# Patient Record
Sex: FEMALE | Race: WHITE | Employment: FULL TIME | URBAN - METROPOLITAN AREA
[De-identification: names, ages, dates, MRNs, and addresses within clinical notes are randomized per-mention and may not be internally consistent; named-entity substitution may affect disease eponyms.]

---

## 2017-04-20 ENCOUNTER — HOSPITAL ENCOUNTER (OUTPATIENT)
Age: 52
Setting detail: SPECIMEN
Discharge: HOME | End: 2017-04-20
Payer: COMMERCIAL

## 2017-04-20 ENCOUNTER — OFFICE VISIT (OUTPATIENT)
Dept: INTERNAL MEDICINE CLINIC | Age: 52
End: 2017-04-20
Payer: COMMERCIAL

## 2017-04-20 VITALS
SYSTOLIC BLOOD PRESSURE: 120 MMHG | HEIGHT: 63 IN | TEMPERATURE: 98.3 F | BODY MASS INDEX: 26.86 KG/M2 | OXYGEN SATURATION: 98 % | DIASTOLIC BLOOD PRESSURE: 70 MMHG | WEIGHT: 151.6 LBS | RESPIRATION RATE: 17 BRPM | HEART RATE: 57 BPM

## 2017-04-20 DIAGNOSIS — M54.2 DISCOGENIC CERVICAL PAIN: ICD-10-CM

## 2017-04-20 DIAGNOSIS — R53.83 OTHER FATIGUE: ICD-10-CM

## 2017-04-20 DIAGNOSIS — E78.49 OTHER HYPERLIPIDEMIA: Primary | ICD-10-CM

## 2017-04-20 DIAGNOSIS — F41.9 ANXIETY: ICD-10-CM

## 2017-04-20 DIAGNOSIS — Z91.199 NON-COMPLIANT PATIENT: ICD-10-CM

## 2017-04-20 DIAGNOSIS — Z12.11 SPECIAL SCREENING FOR MALIGNANT NEOPLASMS, COLON: ICD-10-CM

## 2017-04-20 DIAGNOSIS — E78.49 OTHER HYPERLIPIDEMIA: ICD-10-CM

## 2017-04-20 LAB
ABSOLUTE EOS #: 0.2 K/UL (ref 0–0.4)
ABSOLUTE LYMPH #: 2.1 K/UL (ref 1–4.8)
ABSOLUTE MONO #: 0.4 K/UL (ref 0.1–1.2)
ALBUMIN SERPL-MCNC: 4.1 G/DL (ref 3.5–5.2)
ALBUMIN/GLOBULIN RATIO: 1.3 (ref 1–2.5)
ALP BLD-CCNC: 84 U/L (ref 35–104)
ALT SERPL-CCNC: 12 U/L (ref 5–33)
ANION GAP SERPL CALCULATED.3IONS-SCNC: 12 MMOL/L (ref 9–17)
AST SERPL-CCNC: 16 U/L
BASOPHILS # BLD: 1 % (ref 0–2)
BASOPHILS ABSOLUTE: 0 K/UL (ref 0–0.2)
BILIRUB SERPL-MCNC: 1.12 MG/DL (ref 0.3–1.2)
BUN BLDV-MCNC: 10 MG/DL (ref 6–20)
BUN/CREAT BLD: ABNORMAL (ref 9–20)
CALCIUM SERPL-MCNC: 9.1 MG/DL (ref 8.6–10.4)
CHLORIDE BLD-SCNC: 99 MMOL/L (ref 98–107)
CHOLESTEROL/HDL RATIO: 2.7
CHOLESTEROL: 195 MG/DL
CO2: 28 MMOL/L (ref 20–31)
CREAT SERPL-MCNC: 0.62 MG/DL (ref 0.5–0.9)
DIFFERENTIAL TYPE: NORMAL
EOSINOPHILS RELATIVE PERCENT: 3 % (ref 1–4)
ESTIMATED AVERAGE GLUCOSE: 108 MG/DL
GFR AFRICAN AMERICAN: >60 ML/MIN
GFR NON-AFRICAN AMERICAN: >60 ML/MIN
GFR SERPL CREATININE-BSD FRML MDRD: ABNORMAL ML/MIN/{1.73_M2}
GFR SERPL CREATININE-BSD FRML MDRD: ABNORMAL ML/MIN/{1.73_M2}
GLUCOSE BLD-MCNC: 86 MG/DL (ref 70–99)
HAV IGM SER IA-ACNC: NONREACTIVE
HBA1C MFR BLD: 5.4 % (ref 4–6)
HCT VFR BLD CALC: 40.8 % (ref 36–46)
HDLC SERPL-MCNC: 72 MG/DL
HEMOGLOBIN: 13.8 G/DL (ref 12–16)
HEPATITIS B CORE IGM ANTIBODY: NONREACTIVE
HEPATITIS B SURFACE ANTIGEN: NONREACTIVE
HEPATITIS C ANTIBODY: NONREACTIVE
HIV AG/AB: NONREACTIVE
LDL CHOLESTEROL: 104 MG/DL (ref 0–130)
LYMPHOCYTES # BLD: 36 % (ref 24–44)
MCH RBC QN AUTO: 28.5 PG (ref 26–34)
MCHC RBC AUTO-ENTMCNC: 33.8 G/DL (ref 31–37)
MCV RBC AUTO: 84.3 FL (ref 80–100)
MONOCYTES # BLD: 7 % (ref 2–11)
PDW BLD-RTO: 14.3 % (ref 12.5–15.4)
PLATELET # BLD: 237 K/UL (ref 140–450)
PLATELET ESTIMATE: NORMAL
PMV BLD AUTO: 8.8 FL (ref 6–12)
POTASSIUM SERPL-SCNC: 3.6 MMOL/L (ref 3.7–5.3)
RBC # BLD: 4.84 M/UL (ref 4–5.2)
RBC # BLD: NORMAL 10*6/UL
SEG NEUTROPHILS: 53 % (ref 36–66)
SEGMENTED NEUTROPHILS ABSOLUTE COUNT: 3 K/UL (ref 1.8–7.7)
SODIUM BLD-SCNC: 139 MMOL/L (ref 135–144)
TOTAL PROTEIN: 7.3 G/DL (ref 6.4–8.3)
TRIGL SERPL-MCNC: 94 MG/DL
TSH SERPL DL<=0.05 MIU/L-ACNC: 2.09 MIU/L (ref 0.3–5)
VLDLC SERPL CALC-MCNC: NORMAL MG/DL (ref 1–30)
WBC # BLD: 5.7 K/UL (ref 3.5–11)
WBC # BLD: NORMAL 10*3/UL

## 2017-04-20 PROCEDURE — 99214 OFFICE O/P EST MOD 30 MIN: CPT | Performed by: FAMILY MEDICINE

## 2017-04-20 ASSESSMENT — PATIENT HEALTH QUESTIONNAIRE - PHQ9
2. FEELING DOWN, DEPRESSED OR HOPELESS: 0
1. LITTLE INTEREST OR PLEASURE IN DOING THINGS: 0
SUM OF ALL RESPONSES TO PHQ QUESTIONS 1-9: 0
SUM OF ALL RESPONSES TO PHQ9 QUESTIONS 1 & 2: 0

## 2017-04-20 ASSESSMENT — ENCOUNTER SYMPTOMS
RESPIRATORY NEGATIVE: 1
ALLERGIC/IMMUNOLOGIC NEGATIVE: 1
EYES NEGATIVE: 1
GASTROINTESTINAL NEGATIVE: 1

## 2017-05-01 ENCOUNTER — TELEPHONE (OUTPATIENT)
Dept: INTERNAL MEDICINE CLINIC | Age: 52
End: 2017-05-01

## 2017-05-01 DIAGNOSIS — Z12.11 SPECIAL SCREENING FOR MALIGNANT NEOPLASMS, COLON: ICD-10-CM

## 2017-05-01 DIAGNOSIS — F41.9 ANXIETY: ICD-10-CM

## 2017-05-01 DIAGNOSIS — R53.83 OTHER FATIGUE: ICD-10-CM

## 2017-05-01 DIAGNOSIS — E78.49 OTHER HYPERLIPIDEMIA: ICD-10-CM

## 2017-05-01 LAB
CONTROL: NORMAL
HEMOCCULT STL QL: NEGATIVE

## 2017-05-01 PROCEDURE — 82274 ASSAY TEST FOR BLOOD FECAL: CPT | Performed by: FAMILY MEDICINE

## 2017-08-23 ENCOUNTER — OFFICE VISIT (OUTPATIENT)
Dept: INTERNAL MEDICINE CLINIC | Age: 52
End: 2017-08-23
Payer: COMMERCIAL

## 2017-08-23 ENCOUNTER — HOSPITAL ENCOUNTER (OUTPATIENT)
Dept: GENERAL RADIOLOGY | Age: 52
Discharge: HOME | End: 2017-08-23
Payer: COMMERCIAL

## 2017-08-23 ENCOUNTER — HOSPITAL ENCOUNTER (OUTPATIENT)
Age: 52
Discharge: HOME | End: 2017-08-23
Payer: COMMERCIAL

## 2017-08-23 VITALS
RESPIRATION RATE: 15 BRPM | OXYGEN SATURATION: 96 % | WEIGHT: 152.2 LBS | DIASTOLIC BLOOD PRESSURE: 80 MMHG | SYSTOLIC BLOOD PRESSURE: 110 MMHG | HEART RATE: 59 BPM | BODY MASS INDEX: 26.97 KG/M2 | TEMPERATURE: 97.7 F | HEIGHT: 63 IN

## 2017-08-23 DIAGNOSIS — M54.2 NECK PAIN: ICD-10-CM

## 2017-08-23 DIAGNOSIS — E78.2 MIXED HYPERLIPIDEMIA: ICD-10-CM

## 2017-08-23 DIAGNOSIS — M25.561 CHRONIC PAIN OF RIGHT KNEE: ICD-10-CM

## 2017-08-23 DIAGNOSIS — G89.29 CHRONIC PAIN OF RIGHT KNEE: ICD-10-CM

## 2017-08-23 DIAGNOSIS — M22.41 CHONDROMALACIA OF PATELLOFEMORAL JOINT, RIGHT: Primary | ICD-10-CM

## 2017-08-23 DIAGNOSIS — F41.9 ANXIETY: ICD-10-CM

## 2017-08-23 PROCEDURE — 90471 IMMUNIZATION ADMIN: CPT | Performed by: FAMILY MEDICINE

## 2017-08-23 PROCEDURE — 99214 OFFICE O/P EST MOD 30 MIN: CPT | Performed by: FAMILY MEDICINE

## 2017-08-23 PROCEDURE — 73562 X-RAY EXAM OF KNEE 3: CPT

## 2017-08-23 PROCEDURE — 90715 TDAP VACCINE 7 YRS/> IM: CPT | Performed by: FAMILY MEDICINE

## 2017-08-23 RX ORDER — FLUOXETINE 10 MG/1
10 TABLET, FILM COATED ORAL DAILY
COMMUNITY
End: 2019-02-14 | Stop reason: ALTCHOICE

## 2017-08-23 ASSESSMENT — ENCOUNTER SYMPTOMS
RESPIRATORY NEGATIVE: 1
ALLERGIC/IMMUNOLOGIC NEGATIVE: 1
GASTROINTESTINAL NEGATIVE: 1
EYES NEGATIVE: 1

## 2017-11-02 ENCOUNTER — HOSPITAL ENCOUNTER (OUTPATIENT)
Age: 52
Setting detail: SPECIMEN
Discharge: HOME | End: 2017-11-02
Payer: COMMERCIAL

## 2017-11-02 ENCOUNTER — OFFICE VISIT (OUTPATIENT)
Dept: ORTHOPEDIC SURGERY | Age: 52
End: 2017-11-02
Payer: COMMERCIAL

## 2017-11-02 VITALS — WEIGHT: 152.12 LBS | HEIGHT: 63 IN | BODY MASS INDEX: 26.95 KG/M2

## 2017-11-02 DIAGNOSIS — M17.11 PRIMARY OSTEOARTHRITIS OF RIGHT KNEE: Primary | ICD-10-CM

## 2017-11-02 DIAGNOSIS — M25.561 CHRONIC PAIN OF RIGHT KNEE: ICD-10-CM

## 2017-11-02 DIAGNOSIS — G89.29 CHRONIC PAIN OF RIGHT KNEE: ICD-10-CM

## 2017-11-02 LAB
C-REACTIVE PROTEIN: 3.4 MG/L (ref 0–5)
RHEUMATOID FACTOR: <10 IU/ML
SEDIMENTATION RATE, ERYTHROCYTE: 5 MM (ref 0–20)

## 2017-11-02 PROCEDURE — 99203 OFFICE O/P NEW LOW 30 MIN: CPT | Performed by: ORTHOPAEDIC SURGERY

## 2017-11-16 ENCOUNTER — OFFICE VISIT (OUTPATIENT)
Dept: ORTHOPEDIC SURGERY | Age: 52
End: 2017-11-16
Payer: COMMERCIAL

## 2017-11-16 VITALS — WEIGHT: 152.12 LBS | HEIGHT: 63 IN | BODY MASS INDEX: 26.95 KG/M2

## 2017-11-16 DIAGNOSIS — M25.561 CHRONIC PAIN OF RIGHT KNEE: Primary | ICD-10-CM

## 2017-11-16 DIAGNOSIS — G89.29 CHRONIC PAIN OF RIGHT KNEE: Primary | ICD-10-CM

## 2017-11-16 DIAGNOSIS — M17.11 PATELLOFEMORAL ARTHRITIS OF RIGHT KNEE: ICD-10-CM

## 2017-11-16 PROCEDURE — 20610 DRAIN/INJ JOINT/BURSA W/O US: CPT | Performed by: ORTHOPAEDIC SURGERY

## 2017-11-16 PROCEDURE — 99213 OFFICE O/P EST LOW 20 MIN: CPT | Performed by: ORTHOPAEDIC SURGERY

## 2017-11-16 RX ORDER — METHYLPREDNISOLONE ACETATE 40 MG/ML
40 INJECTION, SUSPENSION INTRA-ARTICULAR; INTRALESIONAL; INTRAMUSCULAR; SOFT TISSUE ONCE
Status: COMPLETED | OUTPATIENT
Start: 2017-11-16 | End: 2017-11-16

## 2017-11-16 RX ADMIN — METHYLPREDNISOLONE ACETATE 40 MG: 40 INJECTION, SUSPENSION INTRA-ARTICULAR; INTRALESIONAL; INTRAMUSCULAR; SOFT TISSUE at 10:28

## 2017-11-16 NOTE — PROGRESS NOTES
This patient still continues to have pain in the knee. She again describes this as over the superolateral aspect of the patella. She has complaints of minimal pain on the medial side of the joint. She continues to her problem getting in and out of the car as well. Examination: There was some minimal tenderness over the medial joint line. There is significant patellofemoral grating. There is definite tenderness over the superolateral aspect of the patella with possible palpable spur. I again reviewed her x-rays which did not include sunrise view of the patella. Diagnosis: Primary osteoarthritis of the right knee but mainly affecting the patellofemoral compartment clinically. Treatment: After thoroughly cleaning the skin with Betadine and alcohol I injected her right knee with 40 mg Depo-Medrol and 5 mL of 1% plain lidocaine. She did feel significant improvement in her symptoms when ambulating here in the hallway. I will see her in 2 weeks' time but should her symptoms have subsided that she will call up and cancel the appointment.

## 2017-12-28 ENCOUNTER — OFFICE VISIT (OUTPATIENT)
Dept: INTERNAL MEDICINE CLINIC | Age: 52
End: 2017-12-28
Payer: COMMERCIAL

## 2017-12-28 VITALS
RESPIRATION RATE: 17 BRPM | BODY MASS INDEX: 28 KG/M2 | HEIGHT: 63 IN | HEART RATE: 71 BPM | SYSTOLIC BLOOD PRESSURE: 110 MMHG | WEIGHT: 158 LBS | OXYGEN SATURATION: 98 % | DIASTOLIC BLOOD PRESSURE: 70 MMHG

## 2017-12-28 DIAGNOSIS — Z28.21 INFLUENZA VACCINATION DECLINED: ICD-10-CM

## 2017-12-28 DIAGNOSIS — Z86.59 H/O ANXIETY DISORDER: ICD-10-CM

## 2017-12-28 DIAGNOSIS — M23.91 INTERNAL DERANGEMENT OF RIGHT KNEE: Primary | ICD-10-CM

## 2017-12-28 DIAGNOSIS — E78.49 OTHER HYPERLIPIDEMIA: ICD-10-CM

## 2017-12-28 DIAGNOSIS — Z90.710 H/O: HYSTERECTOMY: ICD-10-CM

## 2017-12-28 PROCEDURE — 99213 OFFICE O/P EST LOW 20 MIN: CPT | Performed by: FAMILY MEDICINE

## 2017-12-28 ASSESSMENT — ENCOUNTER SYMPTOMS
ALLERGIC/IMMUNOLOGIC NEGATIVE: 1
EYES NEGATIVE: 1
GASTROINTESTINAL NEGATIVE: 1
RESPIRATORY NEGATIVE: 1

## 2017-12-28 NOTE — PROGRESS NOTES
Chronic Disease Visit Information    BP Readings from Last 3 Encounters:   12/28/17 110/70   08/23/17 110/80   04/20/17 120/70          Hemoglobin A1C (%)   Date Value   04/20/2017 5.4     LDL Cholesterol (mg/dL)   Date Value   04/20/2017 104     HDL (mg/dL)   Date Value   04/20/2017 72     BUN (mg/dL)   Date Value   04/20/2017 10     CREATININE (mg/dL)   Date Value   04/20/2017 0.62     Glucose (mg/dL)   Date Value   04/20/2017 86            Have you changed or started any medications since your last visit including any over-the-counter medicines, vitamins, or herbal medicines? no   Are you having any side effects from any of your medications? -  no  Have you stopped taking any of your medications? Is so, why? -  no    Have you seen any other physician or provider since your last visit? No  Have you had any other diagnostic tests since your last visit? No  Have you been seen in the emergency room and/or had an admission to a hospital since we last saw you? No  Have you had your annual diabetic retinal (eye) exam? Yes - Records Requested  Have you had your routine dental cleaning in the past 6 months? yes -     Have you activated your [employer] account? If not, what are your barriers?  Yes     Patient Care Team:  Christin Russell MD as PCP - General (Family Medicine)         Medical History Review  Past Medical, Family, and Social History reviewed and does contribute to the patient presenting condition    Health Maintenance   Topic Date Due    Flu vaccine (1) 09/01/2017    Cervical cancer screen  10/30/2017    Colon Cancer Screen FIT/FOBT  05/01/2018    Breast cancer screen  01/10/2019    Lipid screen  04/20/2022    DTaP/Tdap/Td vaccine (2 - Td) 08/23/2027    Hepatitis C screen  Completed    HIV screen  Completed

## 2017-12-28 NOTE — PROGRESS NOTES
Subjective:      Patient ID: Melissa Mercedes is a 45 y.o. female. Hyperlipidemia   This is a chronic problem. The current episode started more than 1 month ago. The problem is controlled. There are no known factors aggravating her hyperlipidemia. Current antihyperlipidemic treatment includes exercise and diet change. The current treatment provides moderate improvement of lipids. Risk factors for coronary artery disease include stress, post-menopausal and dyslipidemia. Review of Systems   Constitutional: Negative. HENT: Negative. Eyes: Negative. Respiratory: Negative. Cardiovascular: Negative. Gastrointestinal: Negative. Endocrine: Negative. Musculoskeletal: Negative. Skin: Negative. Allergic/Immunologic: Negative. Neurological: Negative. Hematological: Negative. Psychiatric/Behavioral: Negative. Past family and social history unremarkable. Objective:   Physical Exam   Constitutional: She is oriented to person, place, and time. She appears well-developed and well-nourished. HENT:   Head: Normocephalic and atraumatic. Right Ear: External ear normal.   Left Ear: External ear normal.   Mouth/Throat: Oropharynx is clear and moist.   Eyes: Conjunctivae and EOM are normal. Pupils are equal, round, and reactive to light. Neck: Normal range of motion. Neck supple. Cardiovascular: Normal rate, regular rhythm, normal heart sounds and intact distal pulses. Pulmonary/Chest: Effort normal and breath sounds normal.   Abdominal: Soft. Bowel sounds are normal.   Genitourinary: Vagina normal and uterus normal.   Musculoskeletal: Normal range of motion. Neurological: She is alert and oriented to person, place, and time. She has normal reflexes. Skin: Skin is warm and dry. Psychiatric: She has a normal mood and affect. Her behavior is normal. Judgment and thought content normal.   Vitals reviewed. Assessment:      1. Internal derangement of right knee     2.  Other

## 2018-06-27 ENCOUNTER — HOSPITAL ENCOUNTER (OUTPATIENT)
Age: 53
Setting detail: SPECIMEN
Discharge: HOME | End: 2018-06-27
Payer: COMMERCIAL

## 2018-06-27 ENCOUNTER — OFFICE VISIT (OUTPATIENT)
Dept: INTERNAL MEDICINE CLINIC | Age: 53
End: 2018-06-27
Payer: COMMERCIAL

## 2018-06-27 VITALS
DIASTOLIC BLOOD PRESSURE: 80 MMHG | HEART RATE: 54 BPM | TEMPERATURE: 98 F | WEIGHT: 156 LBS | SYSTOLIC BLOOD PRESSURE: 110 MMHG | HEIGHT: 63 IN | BODY MASS INDEX: 27.64 KG/M2 | OXYGEN SATURATION: 99 %

## 2018-06-27 DIAGNOSIS — Z12.11 SCREENING FOR COLON CANCER: ICD-10-CM

## 2018-06-27 DIAGNOSIS — E78.49 OTHER HYPERLIPIDEMIA: ICD-10-CM

## 2018-06-27 DIAGNOSIS — M79.18 MUSCULOSKELETAL PAIN: ICD-10-CM

## 2018-06-27 DIAGNOSIS — F41.9 ANXIETY: ICD-10-CM

## 2018-06-27 DIAGNOSIS — Z23 NEED FOR PROPHYLACTIC VACCINATION AND INOCULATION AGAINST VARICELLA: ICD-10-CM

## 2018-06-27 DIAGNOSIS — E78.49 OTHER HYPERLIPIDEMIA: Primary | ICD-10-CM

## 2018-06-27 LAB
ALBUMIN SERPL-MCNC: 4.2 G/DL (ref 3.5–5.2)
ALBUMIN/GLOBULIN RATIO: 1.4 (ref 1–2.5)
ALP BLD-CCNC: 86 U/L (ref 35–104)
ALT SERPL-CCNC: 16 U/L (ref 5–33)
ANION GAP SERPL CALCULATED.3IONS-SCNC: 12 MMOL/L (ref 9–17)
AST SERPL-CCNC: 20 U/L
BILIRUB SERPL-MCNC: 0.99 MG/DL (ref 0.3–1.2)
BUN BLDV-MCNC: 13 MG/DL (ref 6–20)
BUN/CREAT BLD: ABNORMAL (ref 9–20)
CALCIUM SERPL-MCNC: 8.8 MG/DL (ref 8.6–10.4)
CHLORIDE BLD-SCNC: 108 MMOL/L (ref 98–107)
CHOLESTEROL/HDL RATIO: 2.7
CHOLESTEROL: 182 MG/DL
CO2: 26 MMOL/L (ref 20–31)
CREAT SERPL-MCNC: 0.56 MG/DL (ref 0.5–0.9)
GFR AFRICAN AMERICAN: >60 ML/MIN
GFR NON-AFRICAN AMERICAN: >60 ML/MIN
GFR SERPL CREATININE-BSD FRML MDRD: ABNORMAL ML/MIN/{1.73_M2}
GFR SERPL CREATININE-BSD FRML MDRD: ABNORMAL ML/MIN/{1.73_M2}
GLUCOSE BLD-MCNC: 84 MG/DL (ref 70–99)
HCT VFR BLD CALC: 42.2 % (ref 36.3–47.1)
HDLC SERPL-MCNC: 68 MG/DL
HEMOGLOBIN: 13.4 G/DL (ref 11.9–15.1)
LDL CHOLESTEROL: 101 MG/DL (ref 0–130)
MCH RBC QN AUTO: 28.1 PG (ref 25.2–33.5)
MCHC RBC AUTO-ENTMCNC: 31.8 G/DL (ref 28.4–34.8)
MCV RBC AUTO: 88.5 FL (ref 82.6–102.9)
NRBC AUTOMATED: 0 PER 100 WBC
PDW BLD-RTO: 13 % (ref 11.8–14.4)
PLATELET # BLD: 259 K/UL (ref 138–453)
PMV BLD AUTO: 10.5 FL (ref 8.1–13.5)
POTASSIUM SERPL-SCNC: 4.1 MMOL/L (ref 3.7–5.3)
RBC # BLD: 4.77 M/UL (ref 3.95–5.11)
SODIUM BLD-SCNC: 146 MMOL/L (ref 135–144)
TOTAL PROTEIN: 7.2 G/DL (ref 6.4–8.3)
TRIGL SERPL-MCNC: 64 MG/DL
TSH SERPL DL<=0.05 MIU/L-ACNC: 2.16 MIU/L (ref 0.3–5)
VLDLC SERPL CALC-MCNC: NORMAL MG/DL (ref 1–30)
WBC # BLD: 5.1 K/UL (ref 3.5–11.3)

## 2018-06-27 PROCEDURE — 99213 OFFICE O/P EST LOW 20 MIN: CPT | Performed by: FAMILY MEDICINE

## 2018-06-27 ASSESSMENT — PATIENT HEALTH QUESTIONNAIRE - PHQ9
SUM OF ALL RESPONSES TO PHQ QUESTIONS 1-9: 0
SUM OF ALL RESPONSES TO PHQ9 QUESTIONS 1 & 2: 0
2. FEELING DOWN, DEPRESSED OR HOPELESS: 0
1. LITTLE INTEREST OR PLEASURE IN DOING THINGS: 0

## 2018-06-27 ASSESSMENT — ENCOUNTER SYMPTOMS
RESPIRATORY NEGATIVE: 1
GASTROINTESTINAL NEGATIVE: 1
EYES NEGATIVE: 1
ALLERGIC/IMMUNOLOGIC NEGATIVE: 1

## 2018-07-11 DIAGNOSIS — Z12.11 SCREENING FOR COLON CANCER: ICD-10-CM

## 2018-07-11 LAB
CONTROL: NORMAL
HEMOCCULT STL QL: NORMAL

## 2018-07-11 PROCEDURE — 82274 ASSAY TEST FOR BLOOD FECAL: CPT | Performed by: FAMILY MEDICINE

## 2018-12-20 ENCOUNTER — OFFICE VISIT (OUTPATIENT)
Dept: INTERNAL MEDICINE CLINIC | Age: 53
End: 2018-12-20
Payer: COMMERCIAL

## 2018-12-20 VITALS
OXYGEN SATURATION: 98 % | SYSTOLIC BLOOD PRESSURE: 120 MMHG | HEART RATE: 59 BPM | BODY MASS INDEX: 27.57 KG/M2 | RESPIRATION RATE: 15 BRPM | WEIGHT: 155.6 LBS | DIASTOLIC BLOOD PRESSURE: 80 MMHG | HEIGHT: 63 IN

## 2018-12-20 DIAGNOSIS — F41.9 ANXIETY: ICD-10-CM

## 2018-12-20 DIAGNOSIS — M23.91 INTERNAL DERANGEMENT OF MULTIPLE SITES OF RIGHT KNEE: ICD-10-CM

## 2018-12-20 DIAGNOSIS — Z12.39 BREAST SCREENING: ICD-10-CM

## 2018-12-20 DIAGNOSIS — Z28.21 INFLUENZA VACCINATION DECLINED: ICD-10-CM

## 2018-12-20 DIAGNOSIS — E78.2 MIXED HYPERLIPIDEMIA: Primary | ICD-10-CM

## 2018-12-20 DIAGNOSIS — Z90.710 H/O: HYSTERECTOMY: ICD-10-CM

## 2018-12-20 PROCEDURE — 99213 OFFICE O/P EST LOW 20 MIN: CPT | Performed by: FAMILY MEDICINE

## 2018-12-20 ASSESSMENT — ENCOUNTER SYMPTOMS
ALLERGIC/IMMUNOLOGIC NEGATIVE: 1
RESPIRATORY NEGATIVE: 1
GASTROINTESTINAL NEGATIVE: 1
EYES NEGATIVE: 1

## 2019-01-09 ENCOUNTER — HOSPITAL ENCOUNTER (OUTPATIENT)
Dept: MAMMOGRAPHY | Age: 54
Discharge: HOME | End: 2019-01-11
Payer: COMMERCIAL

## 2019-01-09 DIAGNOSIS — Z12.39 BREAST SCREENING: ICD-10-CM

## 2019-01-09 PROCEDURE — 77067 SCR MAMMO BI INCL CAD: CPT

## 2019-02-14 ENCOUNTER — OFFICE VISIT (OUTPATIENT)
Dept: ORTHOPEDIC SURGERY | Age: 54
End: 2019-02-14
Payer: COMMERCIAL

## 2019-02-14 VITALS — WEIGHT: 158 LBS | BODY MASS INDEX: 28 KG/M2 | HEIGHT: 63 IN

## 2019-02-14 DIAGNOSIS — G89.29 CHRONIC PAIN OF RIGHT KNEE: Primary | ICD-10-CM

## 2019-02-14 DIAGNOSIS — M25.561 CHRONIC PAIN OF RIGHT KNEE: Primary | ICD-10-CM

## 2019-02-14 DIAGNOSIS — M17.11 PATELLOFEMORAL ARTHRITIS OF RIGHT KNEE: ICD-10-CM

## 2019-02-14 PROCEDURE — 99213 OFFICE O/P EST LOW 20 MIN: CPT | Performed by: ORTHOPAEDIC SURGERY

## 2019-02-14 PROCEDURE — 20610 DRAIN/INJ JOINT/BURSA W/O US: CPT | Performed by: ORTHOPAEDIC SURGERY

## 2019-02-14 RX ORDER — METHYLPREDNISOLONE ACETATE 40 MG/ML
40 INJECTION, SUSPENSION INTRA-ARTICULAR; INTRALESIONAL; INTRAMUSCULAR; SOFT TISSUE ONCE
Status: COMPLETED | OUTPATIENT
Start: 2019-02-14 | End: 2019-02-14

## 2019-02-14 RX ADMIN — METHYLPREDNISOLONE ACETATE 40 MG: 40 INJECTION, SUSPENSION INTRA-ARTICULAR; INTRALESIONAL; INTRAMUSCULAR; SOFT TISSUE at 11:19

## 2019-06-19 ENCOUNTER — OFFICE VISIT (OUTPATIENT)
Dept: INTERNAL MEDICINE CLINIC | Age: 54
End: 2019-06-19
Payer: COMMERCIAL

## 2019-06-19 ENCOUNTER — HOSPITAL ENCOUNTER (OUTPATIENT)
Age: 54
Setting detail: SPECIMEN
Discharge: HOME | End: 2019-06-19
Payer: COMMERCIAL

## 2019-06-19 VITALS
HEIGHT: 63 IN | HEART RATE: 64 BPM | SYSTOLIC BLOOD PRESSURE: 130 MMHG | DIASTOLIC BLOOD PRESSURE: 80 MMHG | WEIGHT: 163.2 LBS | RESPIRATION RATE: 16 BRPM | BODY MASS INDEX: 28.92 KG/M2 | OXYGEN SATURATION: 99 %

## 2019-06-19 DIAGNOSIS — E78.2 MIXED HYPERLIPIDEMIA: ICD-10-CM

## 2019-06-19 DIAGNOSIS — F41.9 ANXIETY: ICD-10-CM

## 2019-06-19 DIAGNOSIS — Z90.710 H/O: HYSTERECTOMY: ICD-10-CM

## 2019-06-19 DIAGNOSIS — R63.5 WEIGHT GAIN: Primary | ICD-10-CM

## 2019-06-19 DIAGNOSIS — M54.2 NECK PAIN: ICD-10-CM

## 2019-06-19 DIAGNOSIS — Z28.21 INFLUENZA VACCINATION DECLINED: ICD-10-CM

## 2019-06-19 DIAGNOSIS — R63.5 WEIGHT GAIN: ICD-10-CM

## 2019-06-19 LAB
ALBUMIN SERPL-MCNC: 4.4 G/DL (ref 3.5–5.2)
ALBUMIN/GLOBULIN RATIO: 1.6 (ref 1–2.5)
ALP BLD-CCNC: 85 U/L (ref 35–104)
ALT SERPL-CCNC: 16 U/L (ref 5–33)
ANION GAP SERPL CALCULATED.3IONS-SCNC: 16 MMOL/L (ref 9–17)
AST SERPL-CCNC: 17 U/L
BILIRUB SERPL-MCNC: 0.8 MG/DL (ref 0.3–1.2)
BUN BLDV-MCNC: 10 MG/DL (ref 6–20)
BUN/CREAT BLD: ABNORMAL (ref 9–20)
CALCIUM SERPL-MCNC: 8.9 MG/DL (ref 8.6–10.4)
CHLORIDE BLD-SCNC: 105 MMOL/L (ref 98–107)
CHOLESTEROL/HDL RATIO: 2.9
CHOLESTEROL: 190 MG/DL
CO2: 25 MMOL/L (ref 20–31)
CREAT SERPL-MCNC: 0.47 MG/DL (ref 0.5–0.9)
ESTIMATED AVERAGE GLUCOSE: 114 MG/DL
GFR AFRICAN AMERICAN: >60 ML/MIN
GFR NON-AFRICAN AMERICAN: >60 ML/MIN
GFR SERPL CREATININE-BSD FRML MDRD: ABNORMAL ML/MIN/{1.73_M2}
GFR SERPL CREATININE-BSD FRML MDRD: ABNORMAL ML/MIN/{1.73_M2}
GLUCOSE BLD-MCNC: 89 MG/DL (ref 70–99)
HBA1C MFR BLD: 5.6 % (ref 4–6)
HCT VFR BLD CALC: 43.9 % (ref 36.3–47.1)
HDLC SERPL-MCNC: 66 MG/DL
HEMOGLOBIN: 13.9 G/DL (ref 11.9–15.1)
LDL CHOLESTEROL: 106 MG/DL (ref 0–130)
MCH RBC QN AUTO: 28.4 PG (ref 25.2–33.5)
MCHC RBC AUTO-ENTMCNC: 31.7 G/DL (ref 28.4–34.8)
MCV RBC AUTO: 89.8 FL (ref 82.6–102.9)
NRBC AUTOMATED: 0 PER 100 WBC
PDW BLD-RTO: 12.7 % (ref 11.8–14.4)
PLATELET # BLD: 281 K/UL (ref 138–453)
PMV BLD AUTO: 10.6 FL (ref 8.1–13.5)
POTASSIUM SERPL-SCNC: 4 MMOL/L (ref 3.7–5.3)
RBC # BLD: 4.89 M/UL (ref 3.95–5.11)
SODIUM BLD-SCNC: 146 MMOL/L (ref 135–144)
TOTAL PROTEIN: 7.1 G/DL (ref 6.4–8.3)
TRIGL SERPL-MCNC: 88 MG/DL
TSH SERPL DL<=0.05 MIU/L-ACNC: 2.79 MIU/L (ref 0.3–5)
VITAMIN D 25-HYDROXY: 30 NG/ML (ref 30–100)
VLDLC SERPL CALC-MCNC: NORMAL MG/DL (ref 1–30)
WBC # BLD: 5.1 K/UL (ref 3.5–11.3)

## 2019-06-19 PROCEDURE — 90670 PCV13 VACCINE IM: CPT | Performed by: FAMILY MEDICINE

## 2019-06-19 PROCEDURE — 90471 IMMUNIZATION ADMIN: CPT | Performed by: FAMILY MEDICINE

## 2019-06-19 PROCEDURE — 99213 OFFICE O/P EST LOW 20 MIN: CPT | Performed by: FAMILY MEDICINE

## 2019-06-19 ASSESSMENT — PATIENT HEALTH QUESTIONNAIRE - PHQ9
SUM OF ALL RESPONSES TO PHQ QUESTIONS 1-9: 0
SUM OF ALL RESPONSES TO PHQ QUESTIONS 1-9: 0
1. LITTLE INTEREST OR PLEASURE IN DOING THINGS: 0
2. FEELING DOWN, DEPRESSED OR HOPELESS: 0
SUM OF ALL RESPONSES TO PHQ9 QUESTIONS 1 & 2: 0

## 2019-06-19 NOTE — PROGRESS NOTES
Subjective:      Patient ID: Niesha Gan is a 52 y.o. female. 78-year-old  female is presented with subjective complaint of modest weight gain. She denies chest pain, shortness of breath or ambulatory difficulties. Review of Systems   Constitutional: Negative. HENT: Negative. Eyes: Negative. Respiratory: Negative. Cardiovascular: Negative. Gastrointestinal: Negative. Endocrine: Negative. Musculoskeletal: Positive for myalgias and neck pain. Skin: Negative. Allergic/Immunologic: Negative. Neurological: Negative. Hematological: Negative. Psychiatric/Behavioral: The patient is nervous/anxious. Past family and social history unremarkable. Diagnosis Orders   1. Weight gain  CBC    Comprehensive Metabolic Panel    Hemoglobin A1C    Lipid Panel    TSH without Reflex    Vitamin D 25 Hydroxy   2. Anxiety     3. Mixed hyperlipidemia  CBC    Comprehensive Metabolic Panel    Hemoglobin A1C    Lipid Panel    TSH without Reflex    Vitamin D 25 Hydroxy   4. Neck pain     5. Influenza vaccination declined     6. H/O: hysterectomy           Objective:   Physical Exam   Constitutional: She is oriented to person, place, and time. She appears well-developed and well-nourished. HENT:   Head: Normocephalic and atraumatic. Right Ear: External ear normal.   Left Ear: External ear normal.   Mouth/Throat: Oropharynx is clear and moist.   Eyes: Pupils are equal, round, and reactive to light. Conjunctivae and EOM are normal.   Neck: Normal range of motion. Neck supple. Cardiovascular: Normal rate, regular rhythm, normal heart sounds and intact distal pulses. Pulmonary/Chest: Effort normal and breath sounds normal.   Abdominal: Soft. Bowel sounds are normal.   Genitourinary: Vagina normal and uterus normal.   Musculoskeletal: Normal range of motion. Musculoskeletal pain. Neurologically intact   Neurological: She is alert and oriented to person, place, and time.  She has normal reflexes. Skin: Skin is warm and dry. Psychiatric:   Generalized anxiety disorder. She is reluctant to be on any medication. She denies suicidality   Nursing note and vitals reviewed. Assessment:       Diagnosis Orders   1. Weight gain  CBC    Comprehensive Metabolic Panel    Hemoglobin A1C    Lipid Panel    TSH without Reflex    Vitamin D 25 Hydroxy   2. Anxiety     3. Mixed hyperlipidemia  CBC    Comprehensive Metabolic Panel    Hemoglobin A1C    Lipid Panel    TSH without Reflex    Vitamin D 25 Hydroxy   4. Neck pain     5. Influenza vaccination declined     6. H/O: hysterectomy             Plan:      77-year-old  female is presented with subjective complaint of modest weight gain. She is afebrile hemodynamically stable, clinical examination is benign. Reassurance provided. She would benefit from low-fat high-fiber diet, daily moderate exercise, lifestyle change. We aim to keep her BMI around 25. Underlying history of generalized anxiety disorder in need of lots of reassurance. Additionally, she has been reluctant to be on any medication or professional counseling. She denies suicidality. Musculoskeletal neck pain. Neurologically intact. May take over-the-counter Tylenol as needed. Once again she declined influenza vaccination however she wished to proceed with Pneumovax  Dyslipidemia. Nonpharmacologically treated  History of hysterectomy  She is updated on unremarkable mammogram  She denies tobacco, excessive alcohol or illicit drug use  Med list reviewed advised to continue  Further recommendations to follow labs  Observe and call for any concerns  This note is created with a voice recognition program and while intend to generate a document that accurately reflects the content of the visit, no guarantee can be provided that every mistake has been identified and corrected by editing.           Elan Robbins MD

## 2019-06-19 NOTE — PROGRESS NOTES
Chronic Disease Visit Information    BP Readings from Last 3 Encounters:   12/20/18 120/80   06/27/18 110/80   12/28/17 110/70          Hemoglobin A1C (%)   Date Value   04/20/2017 5.4     LDL Cholesterol (mg/dL)   Date Value   06/27/2018 101     HDL (mg/dL)   Date Value   06/27/2018 68     BUN (mg/dL)   Date Value   06/27/2018 13     CREATININE (mg/dL)   Date Value   06/27/2018 0.56     Glucose (mg/dL)   Date Value   06/27/2018 84            Have you changed or started any medications since your last visit including any over-the-counter medicines, vitamins, or herbal medicines? no   Are you having any side effects from any of your medications? -  no  Have you stopped taking any of your medications? Is so, why? -  no    Have you seen any other physician or provider since your last visit? No  Have you had any other diagnostic tests since your last visit? No  Have you been seen in the emergency room and/or had an admission to a hospital since we last saw you? No  Have you had your annual diabetic retinal (eye) exam? No  Have you had your routine dental cleaning in the past 6 months? no    Have you activated your [employer] account? If not, what are your barriers?  Yes     Patient Care Team:  Marti Bailey MD as PCP - General (Family Medicine)  Marti Bailey MD as PCP - [facility] Provider         Medical History Review  Past Medical, Family, and Social History reviewed and does not contribute to the patient presenting condition    Health Maintenance   Topic Date Due    Colon Cancer Screen FIT/FOBT  07/08/2019    Shingles Vaccine (1 of 2) 12/19/2019 (Originally 10/31/2013)    Flu vaccine (Season Ended) 09/01/2019    Breast cancer screen  01/09/2021    Lipid screen  06/27/2023    DTaP/Tdap/Td vaccine (2 - Td) 08/23/2027    Hepatitis C screen  Completed    HIV screen  Completed    Pneumococcal 0-64 years Vaccine  Aged Out

## 2019-12-19 ENCOUNTER — OFFICE VISIT (OUTPATIENT)
Dept: INTERNAL MEDICINE CLINIC | Age: 54
End: 2019-12-19
Payer: COMMERCIAL

## 2019-12-19 VITALS
WEIGHT: 157.4 LBS | RESPIRATION RATE: 15 BRPM | DIASTOLIC BLOOD PRESSURE: 80 MMHG | OXYGEN SATURATION: 99 % | HEIGHT: 63 IN | SYSTOLIC BLOOD PRESSURE: 128 MMHG | HEART RATE: 53 BPM | BODY MASS INDEX: 27.89 KG/M2 | TEMPERATURE: 97.3 F

## 2019-12-19 DIAGNOSIS — Z28.21 INFLUENZA VACCINATION DECLINED: ICD-10-CM

## 2019-12-19 DIAGNOSIS — E78.2 MIXED HYPERLIPIDEMIA: ICD-10-CM

## 2019-12-19 DIAGNOSIS — F41.9 ANXIETY: ICD-10-CM

## 2019-12-19 DIAGNOSIS — Z90.710 H/O: HYSTERECTOMY: ICD-10-CM

## 2019-12-19 DIAGNOSIS — M54.2 NECK PAIN: ICD-10-CM

## 2019-12-19 DIAGNOSIS — I87.2 PERIPHERAL VENOUS INSUFFICIENCY: Primary | ICD-10-CM

## 2019-12-19 PROBLEM — R63.5 WEIGHT GAIN: Status: RESOLVED | Noted: 2019-06-19 | Resolved: 2019-12-19

## 2019-12-19 PROCEDURE — 99214 OFFICE O/P EST MOD 30 MIN: CPT | Performed by: FAMILY MEDICINE

## 2019-12-19 ASSESSMENT — ENCOUNTER SYMPTOMS
RESPIRATORY NEGATIVE: 1
ALLERGIC/IMMUNOLOGIC NEGATIVE: 1
GASTROINTESTINAL NEGATIVE: 1
BACK PAIN: 1
EYES NEGATIVE: 1

## 2020-06-19 ENCOUNTER — HOSPITAL ENCOUNTER (OUTPATIENT)
Age: 55
Setting detail: SPECIMEN
Discharge: HOME | End: 2020-06-19
Payer: COMMERCIAL

## 2020-06-19 ENCOUNTER — OFFICE VISIT (OUTPATIENT)
Dept: INTERNAL MEDICINE CLINIC | Age: 55
End: 2020-06-19
Payer: COMMERCIAL

## 2020-06-19 VITALS
WEIGHT: 153.8 LBS | BODY MASS INDEX: 27.25 KG/M2 | SYSTOLIC BLOOD PRESSURE: 110 MMHG | HEART RATE: 63 BPM | OXYGEN SATURATION: 99 % | RESPIRATION RATE: 15 BRPM | TEMPERATURE: 97.7 F | DIASTOLIC BLOOD PRESSURE: 80 MMHG | HEIGHT: 63 IN

## 2020-06-19 PROBLEM — N95.1 MENOPAUSAL VAGINAL DRYNESS: Status: ACTIVE | Noted: 2020-06-19

## 2020-06-19 LAB
ALBUMIN SERPL-MCNC: 4.1 G/DL (ref 3.5–5.2)
ALBUMIN/GLOBULIN RATIO: 1.5 (ref 1–2.5)
ALP BLD-CCNC: 80 U/L (ref 35–104)
ALT SERPL-CCNC: 19 U/L (ref 5–33)
ANION GAP SERPL CALCULATED.3IONS-SCNC: 14 MMOL/L (ref 9–17)
AST SERPL-CCNC: 20 U/L
BILIRUB SERPL-MCNC: 0.71 MG/DL (ref 0.3–1.2)
BUN BLDV-MCNC: 16 MG/DL (ref 6–20)
BUN/CREAT BLD: ABNORMAL (ref 9–20)
CALCIUM SERPL-MCNC: 8.6 MG/DL (ref 8.6–10.4)
CHLORIDE BLD-SCNC: 106 MMOL/L (ref 98–107)
CHOLESTEROL/HDL RATIO: 2.8
CHOLESTEROL: 184 MG/DL
CO2: 25 MMOL/L (ref 20–31)
CREAT SERPL-MCNC: 0.59 MG/DL (ref 0.5–0.9)
ESTIMATED AVERAGE GLUCOSE: 117 MG/DL
GFR AFRICAN AMERICAN: >60 ML/MIN
GFR NON-AFRICAN AMERICAN: >60 ML/MIN
GFR SERPL CREATININE-BSD FRML MDRD: ABNORMAL ML/MIN/{1.73_M2}
GFR SERPL CREATININE-BSD FRML MDRD: ABNORMAL ML/MIN/{1.73_M2}
GLUCOSE BLD-MCNC: 85 MG/DL (ref 70–99)
HBA1C MFR BLD: 5.7 % (ref 4–6)
HCT VFR BLD CALC: 43 % (ref 36.3–47.1)
HDLC SERPL-MCNC: 65 MG/DL
HEMOGLOBIN: 13.5 G/DL (ref 11.9–15.1)
LDL CHOLESTEROL: 106 MG/DL (ref 0–130)
MCH RBC QN AUTO: 28.4 PG (ref 25.2–33.5)
MCHC RBC AUTO-ENTMCNC: 31.4 G/DL (ref 28.4–34.8)
MCV RBC AUTO: 90.3 FL (ref 82.6–102.9)
NRBC AUTOMATED: 0 PER 100 WBC
PDW BLD-RTO: 13.2 % (ref 11.8–14.4)
PLATELET # BLD: 266 K/UL (ref 138–453)
PMV BLD AUTO: 10.7 FL (ref 8.1–13.5)
POTASSIUM SERPL-SCNC: 3.6 MMOL/L (ref 3.7–5.3)
RBC # BLD: 4.76 M/UL (ref 3.95–5.11)
SODIUM BLD-SCNC: 145 MMOL/L (ref 135–144)
TOTAL PROTEIN: 6.9 G/DL (ref 6.4–8.3)
TRIGL SERPL-MCNC: 64 MG/DL
TSH SERPL DL<=0.05 MIU/L-ACNC: 2.35 MIU/L (ref 0.3–5)
VLDLC SERPL CALC-MCNC: NORMAL MG/DL (ref 1–30)
WBC # BLD: 5.4 K/UL (ref 3.5–11.3)

## 2020-06-19 PROCEDURE — 99214 OFFICE O/P EST MOD 30 MIN: CPT | Performed by: FAMILY MEDICINE

## 2020-06-19 ASSESSMENT — ENCOUNTER SYMPTOMS
ALLERGIC/IMMUNOLOGIC NEGATIVE: 1
GASTROINTESTINAL NEGATIVE: 1
EYES NEGATIVE: 1
RESPIRATORY NEGATIVE: 1

## 2020-06-19 ASSESSMENT — PATIENT HEALTH QUESTIONNAIRE - PHQ9
SUM OF ALL RESPONSES TO PHQ QUESTIONS 1-9: 0
SUM OF ALL RESPONSES TO PHQ9 QUESTIONS 1 & 2: 0
1. LITTLE INTEREST OR PLEASURE IN DOING THINGS: 0
SUM OF ALL RESPONSES TO PHQ QUESTIONS 1-9: 0
2. FEELING DOWN, DEPRESSED OR HOPELESS: 0

## 2020-06-19 NOTE — PROGRESS NOTES
Subjective:      Patient ID: Curtis Dooley is a 62 y.o. female. Dysuria    This is a new problem. The current episode started 1 to 4 weeks ago. The problem has been resolved. The pain is mild. There has been no fever. She is not sexually active. There is no history of pyelonephritis. She has tried nothing for the symptoms. The treatment provided moderate relief. Review of Systems   Constitutional: Negative. HENT: Negative. Eyes: Negative. Respiratory: Negative. Cardiovascular: Negative. Gastrointestinal: Negative. Endocrine: Negative. Genitourinary: Positive for dysuria. Musculoskeletal: Negative. Skin: Negative. Allergic/Immunologic: Negative. Neurological: Negative. Hematological: Negative. Psychiatric/Behavioral: The patient is nervous/anxious. Past family and social history unremarkable. Diagnosis Orders   1. Dysuria     2. Anxiety     3. Mixed hyperlipidemia     4. Influenza vaccination declined     5. H/O: hysterectomy     6. Peripheral venous insufficiency     7. Colon cancer screening  Cologuard (For External Results Only)   8. Cystocele, midline  Urinalysis Reflex to Culture    To[facility], DO, Urogynecology, [place]    CBC    Comprehensive Metabolic Panel    Hemoglobin A1C    Lipid Panel    TSH without Reflex   9. Family history of diabetes mellitus (DM)  CBC    Comprehensive Metabolic Panel    Hemoglobin A1C    Lipid Panel    TSH without Reflex   10. Menopausal vaginal dryness           Objective:   Physical Exam  Vitals signs and nursing note reviewed. Constitutional:       Appearance: She is well-developed. HENT:      Head: Normocephalic and atraumatic. Right Ear: External ear normal.      Left Ear: External ear normal.   Eyes:      Conjunctiva/sclera: Conjunctivae normal.      Pupils: Pupils are equal, round, and reactive to light. Neck:      Musculoskeletal: Normal range of motion and neck supple.    Cardiovascular:      Rate and denies urinary incontinence. Postmenopausal vaginal dryness. Patient states that she is been evaluated by her gynecologist.  She did not respond to topical hormones, lubricants. She states that she stopped having intercourse with her  because she suffers dyspareunia with vaginal bleed. Further recommendations to follow with GYN  History of hysterectomy  Anxiety about health. However she denies suicidality. Reassurance provided. Consider antianxiety medications  Dyslipidemia being nonpharmacologically treated. She will benefit from low-fat high-fiber diet, daily moderate exercise  History of varicose veins lower extremities status post venous surgery by vascular services. She is asymptomatic  She denies tobacco, excessive alcohol or illicit drug use   Further recommendations to follow labs  Positive family history of diabetes. Risk factor stratification is advised. I have ordered hemoglobin A1c  She will be provided with Cologuard  This note is created with a voice recognition program and while intend to generate a document that accurately reflects the content of the visit, no guarantee can be provided that every mistake has been identified and corrected by editing.        Chintan Burgess MD

## 2020-12-17 ENCOUNTER — OFFICE VISIT (OUTPATIENT)
Dept: INTERNAL MEDICINE CLINIC | Age: 55
End: 2020-12-17
Payer: COMMERCIAL

## 2020-12-17 VITALS
TEMPERATURE: 97.7 F | WEIGHT: 155 LBS | SYSTOLIC BLOOD PRESSURE: 112 MMHG | DIASTOLIC BLOOD PRESSURE: 80 MMHG | BODY MASS INDEX: 28.52 KG/M2 | HEIGHT: 62 IN | HEART RATE: 64 BPM | RESPIRATION RATE: 18 BRPM

## 2020-12-17 PROCEDURE — 99214 OFFICE O/P EST MOD 30 MIN: CPT | Performed by: FAMILY MEDICINE

## 2020-12-17 NOTE — PROGRESS NOTES
Subjective:      Patient ID: Arun Vergara is a 60 y.o. female. Hyperlipidemia  This is a chronic problem. The current episode started more than 1 month ago. The problem is controlled. Recent lipid tests were reviewed and are variable. Current antihyperlipidemic treatment includes diet change and exercise. The current treatment provides moderate improvement of lipids. There are no compliance problems. Risk factors for coronary artery disease include stress, post-menopausal and dyslipidemia. Review of Systems   Constitutional: Negative. HENT: Negative. Eyes: Negative. Respiratory: Negative. Cardiovascular: Negative. Gastrointestinal: Negative. Endocrine: Negative. Musculoskeletal: Negative. Skin: Negative. Allergic/Immunologic: Negative. Neurological: Negative. Hematological: Negative. Psychiatric/Behavioral: The patient is nervous/anxious. Past family and social history unremarkable. Diagnosis Orders   1. Anxiety     2. Mixed hyperlipidemia     3. Influenza vaccination declined     4. H/O: hysterectomy     5. Peripheral venous insufficiency     6. Menopausal vaginal dryness           Objective:   Physical Exam  Vitals signs and nursing note reviewed. Constitutional:       Appearance: She is well-developed. HENT:      Head: Normocephalic and atraumatic. Right Ear: External ear normal.      Left Ear: External ear normal.   Eyes:      Conjunctiva/sclera: Conjunctivae normal.      Pupils: Pupils are equal, round, and reactive to light. Neck:      Musculoskeletal: Normal range of motion and neck supple. Cardiovascular:      Rate and Rhythm: Normal rate and regular rhythm. Heart sounds: Normal heart sounds. Comments: Dyslipidemia. Nonpharmacologically treated  Pulmonary:      Effort: Pulmonary effort is normal.      Breath sounds: Normal breath sounds. Abdominal:      General: Bowel sounds are normal.      Palpations: Abdomen is soft. Genitourinary:     Vagina: Normal.      Comments: History of cystocele  Musculoskeletal: Normal range of motion. Skin:     General: Skin is warm and dry. Neurological:      Mental Status: She is alert and oriented to person, place, and time. Deep Tendon Reflexes: Reflexes are normal and symmetric. Psychiatric:      Comments: Anxiety         Assessment:       Diagnosis Orders   1. Anxiety     2. Mixed hyperlipidemia     3. Influenza vaccination declined     4. H/O: hysterectomy     5. Peripheral venous insufficiency     6. Menopausal vaginal dryness             Plan:      24-year-old female returns for follow-up. She is afebrile hemodynamically stable, clinical examination is benign  History of dyslipidemia being nonpharmacologically treated. She is advised to continue low-fat high-fiber diet, daily moderate exercise, weight reduction to keep BMI around 25  Anxiety. However she states that she is not depressed. Reassurance provided  History of hysterectomy  Modest peripheral venous insufficiency. Increase mobility as tolerated, decrease weight, leg elevation  History of hysterectomy for benign reasons  She is scheduled for mammography in January 2021  Postmenopausal vaginal dryness. If there is any dyspareunia, she may use K-Y jelly. Once again she declined influenza vaccination  Available labs reviewed, discussed with patient, questions answered  She denies tobacco, excessive alcohol or illicit drug use  She is encouraged to call for any concern  This note is created with a voice recognition program and while intend to generate a document that accurately reflects the content of the visit, no guarantee can be provided that every mistake has been identified and corrected by editing.           Vipul Knutson MD

## 2021-02-11 ENCOUNTER — TELEPHONE (OUTPATIENT)
Dept: INTERNAL MEDICINE CLINIC | Age: 56
End: 2021-02-11

## 2021-02-11 ENCOUNTER — HOSPITAL ENCOUNTER (OUTPATIENT)
Dept: MAMMOGRAPHY | Age: 56
Discharge: HOME | End: 2021-02-13
Payer: COMMERCIAL

## 2021-02-11 DIAGNOSIS — Z12.31 VISIT FOR SCREENING MAMMOGRAM: ICD-10-CM

## 2021-02-11 PROCEDURE — 77063 BREAST TOMOSYNTHESIS BI: CPT

## 2021-03-01 LAB
CONTROL: NORMAL
HEMOCCULT STL QL: NORMAL

## 2021-03-02 DIAGNOSIS — Z12.11 COLON CANCER SCREENING: Primary | ICD-10-CM

## 2021-03-02 PROCEDURE — 82274 ASSAY TEST FOR BLOOD FECAL: CPT | Performed by: FAMILY MEDICINE

## 2021-07-16 ENCOUNTER — HOSPITAL ENCOUNTER (OUTPATIENT)
Age: 56
Setting detail: SPECIMEN
Discharge: HOME | End: 2021-07-16
Payer: COMMERCIAL

## 2021-07-16 ENCOUNTER — OFFICE VISIT (OUTPATIENT)
Dept: INTERNAL MEDICINE CLINIC | Age: 56
End: 2021-07-16
Payer: COMMERCIAL

## 2021-07-16 VITALS
HEIGHT: 62 IN | TEMPERATURE: 96.6 F | WEIGHT: 155 LBS | DIASTOLIC BLOOD PRESSURE: 76 MMHG | HEART RATE: 74 BPM | BODY MASS INDEX: 28.52 KG/M2 | SYSTOLIC BLOOD PRESSURE: 116 MMHG | OXYGEN SATURATION: 100 % | RESPIRATION RATE: 16 BRPM

## 2021-07-16 DIAGNOSIS — I87.2 PERIPHERAL VENOUS INSUFFICIENCY: ICD-10-CM

## 2021-07-16 DIAGNOSIS — M75.81 ROTATOR CUFF TENDONITIS, RIGHT: Primary | ICD-10-CM

## 2021-07-16 DIAGNOSIS — Z90.710 H/O: HYSTERECTOMY: ICD-10-CM

## 2021-07-16 DIAGNOSIS — F41.9 ANXIETY: ICD-10-CM

## 2021-07-16 DIAGNOSIS — E78.2 MIXED HYPERLIPIDEMIA: ICD-10-CM

## 2021-07-16 DIAGNOSIS — Z28.21 COVID-19 VACCINATION DECLINED: ICD-10-CM

## 2021-07-16 DIAGNOSIS — Z28.21 INFLUENZA VACCINATION DECLINED: ICD-10-CM

## 2021-07-16 DIAGNOSIS — N95.1 MENOPAUSAL VAGINAL DRYNESS: ICD-10-CM

## 2021-07-16 LAB
ALBUMIN SERPL-MCNC: 4.1 G/DL (ref 3.5–5.2)
ALBUMIN/GLOBULIN RATIO: 1.4 (ref 1–2.5)
ALP BLD-CCNC: 81 U/L (ref 35–104)
ALT SERPL-CCNC: 19 U/L (ref 5–33)
ANION GAP SERPL CALCULATED.3IONS-SCNC: 13 MMOL/L (ref 9–17)
AST SERPL-CCNC: 17 U/L
BILIRUB SERPL-MCNC: 1.42 MG/DL (ref 0.3–1.2)
BUN BLDV-MCNC: 15 MG/DL (ref 6–20)
BUN/CREAT BLD: ABNORMAL (ref 9–20)
CALCIUM SERPL-MCNC: 8.9 MG/DL (ref 8.6–10.4)
CHLORIDE BLD-SCNC: 107 MMOL/L (ref 98–107)
CHOLESTEROL/HDL RATIO: 2.8
CHOLESTEROL: 192 MG/DL
CO2: 24 MMOL/L (ref 20–31)
CREAT SERPL-MCNC: 0.54 MG/DL (ref 0.5–0.9)
ESTIMATED AVERAGE GLUCOSE: 120 MG/DL
GFR AFRICAN AMERICAN: >60 ML/MIN
GFR NON-AFRICAN AMERICAN: >60 ML/MIN
GFR SERPL CREATININE-BSD FRML MDRD: ABNORMAL ML/MIN/{1.73_M2}
GFR SERPL CREATININE-BSD FRML MDRD: ABNORMAL ML/MIN/{1.73_M2}
GLUCOSE BLD-MCNC: 83 MG/DL (ref 70–99)
HBA1C MFR BLD: 5.8 % (ref 4–6)
HCT VFR BLD CALC: 42.5 % (ref 36.3–47.1)
HDLC SERPL-MCNC: 68 MG/DL
HEMOGLOBIN: 13.1 G/DL (ref 11.9–15.1)
LDL CHOLESTEROL: 111 MG/DL (ref 0–130)
MCH RBC QN AUTO: 27.7 PG (ref 25.2–33.5)
MCHC RBC AUTO-ENTMCNC: 30.8 G/DL (ref 28.4–34.8)
MCV RBC AUTO: 89.9 FL (ref 82.6–102.9)
NRBC AUTOMATED: 0 PER 100 WBC
PDW BLD-RTO: 12.7 % (ref 11.8–14.4)
PLATELET # BLD: 287 K/UL (ref 138–453)
PMV BLD AUTO: 10.2 FL (ref 8.1–13.5)
POTASSIUM SERPL-SCNC: 3.8 MMOL/L (ref 3.7–5.3)
RBC # BLD: 4.73 M/UL (ref 3.95–5.11)
SODIUM BLD-SCNC: 144 MMOL/L (ref 135–144)
TOTAL PROTEIN: 7.1 G/DL (ref 6.4–8.3)
TRIGL SERPL-MCNC: 66 MG/DL
TSH SERPL DL<=0.05 MIU/L-ACNC: 1.89 MIU/L (ref 0.3–5)
VLDLC SERPL CALC-MCNC: NORMAL MG/DL (ref 1–30)
WBC # BLD: 5.8 K/UL (ref 3.5–11.3)

## 2021-07-16 PROCEDURE — 99214 OFFICE O/P EST MOD 30 MIN: CPT | Performed by: FAMILY MEDICINE

## 2021-07-16 SDOH — ECONOMIC STABILITY: FOOD INSECURITY: WITHIN THE PAST 12 MONTHS, YOU WORRIED THAT YOUR FOOD WOULD RUN OUT BEFORE YOU GOT MONEY TO BUY MORE.: NEVER TRUE

## 2021-07-16 SDOH — ECONOMIC STABILITY: FOOD INSECURITY: WITHIN THE PAST 12 MONTHS, THE FOOD YOU BOUGHT JUST DIDN'T LAST AND YOU DIDN'T HAVE MONEY TO GET MORE.: NEVER TRUE

## 2021-07-16 ASSESSMENT — PATIENT HEALTH QUESTIONNAIRE - PHQ9
SUM OF ALL RESPONSES TO PHQ QUESTIONS 1-9: 0
SUM OF ALL RESPONSES TO PHQ9 QUESTIONS 1 & 2: 0
SUM OF ALL RESPONSES TO PHQ QUESTIONS 1-9: 0
2. FEELING DOWN, DEPRESSED OR HOPELESS: 0
1. LITTLE INTEREST OR PLEASURE IN DOING THINGS: 0
SUM OF ALL RESPONSES TO PHQ QUESTIONS 1-9: 0

## 2021-07-16 ASSESSMENT — SOCIAL DETERMINANTS OF HEALTH (SDOH): HOW HARD IS IT FOR YOU TO PAY FOR THE VERY BASICS LIKE FOOD, HOUSING, MEDICAL CARE, AND HEATING?: NOT HARD AT ALL

## 2021-07-16 NOTE — PROGRESS NOTES
Subjective:      Patient ID: Flor Jensen is a 60 y.o. female. Shoulder Pain   Pain location: Right shoulder. This is a chronic problem. The current episode started more than 1 month ago. There has been a history of trauma. The problem occurs constantly. The problem has been waxing and waning. The quality of the pain is described as aching. The pain is at a severity of 5/10. The pain is moderate. She has tried rest for the symptoms. The treatment provided mild relief. Review of Systems   Constitutional: Negative. HENT: Negative. Eyes: Negative. Respiratory: Negative. Cardiovascular: Negative. Gastrointestinal: Negative. Endocrine: Negative. Musculoskeletal: Positive for arthralgias. Skin: Negative. Allergic/Immunologic: Negative. Neurological: Negative. Hematological: Negative. Psychiatric/Behavioral: The patient is nervous/anxious. Past family and social history unremarkable. Diagnosis Orders   1. Rotator cuff tendonitis, right     2. Anxiety     3. Mixed hyperlipidemia  CBC    Comprehensive Metabolic Panel    Hemoglobin A1C    Lipid Panel    TSH without Reflex   4. Influenza vaccination declined     5. H/O: hysterectomy     6. Peripheral venous insufficiency  CBC    Comprehensive Metabolic Panel    Hemoglobin A1C    Lipid Panel    TSH without Reflex   7. Menopausal vaginal dryness     8. COVID-19 vaccination declined         Objective:   Physical Exam  Vitals and nursing note reviewed. Constitutional:       Appearance: She is well-developed. HENT:      Head: Normocephalic and atraumatic. Right Ear: External ear normal.      Left Ear: External ear normal.   Eyes:      Conjunctiva/sclera: Conjunctivae normal.      Pupils: Pupils are equal, round, and reactive to light. Cardiovascular:      Rate and Rhythm: Normal rate and regular rhythm. Heart sounds: Normal heart sounds.       Comments: Dyslipidemia  Pulmonary:      Effort: Pulmonary effort is normal.      Breath sounds: Normal breath sounds. Abdominal:      General: Bowel sounds are normal.      Palpations: Abdomen is soft. Genitourinary:     Vagina: Normal.      Comments: Postmenopausal symptoms  History of hysterectomy  Musculoskeletal:         General: Normal range of motion. Cervical back: Normal range of motion and neck supple. Comments: Rotator cuff tendinitis with positive impingement. Rotator cuff strength is 4+/5. Sulcus sign is negative. Crossarm testing is negative. Neurologically intact   Skin:     General: Skin is warm and dry. Neurological:      Mental Status: She is alert and oriented to person, place, and time. Deep Tendon Reflexes: Reflexes are normal and symmetric. Psychiatric:      Comments: Anxiety         Assessment:       Diagnosis Orders   1. Rotator cuff tendonitis, right     2. Anxiety     3. Mixed hyperlipidemia  CBC    Comprehensive Metabolic Panel    Hemoglobin A1C    Lipid Panel    TSH without Reflex   4. Influenza vaccination declined     5. H/O: hysterectomy     6. Peripheral venous insufficiency  CBC    Comprehensive Metabolic Panel    Hemoglobin A1C    Lipid Panel    TSH without Reflex   7. Menopausal vaginal dryness     8. COVID-19 vaccination declined             Plan:      64-year-old right-hand-dominant  female is presented with 6 months history of right shoulder impingement. Patient states that she had a heavy dog that used to carry because of his sickness. Treatment options including but not limited to anti-inflammatory, physical therapy, cortisone shot. She wished to discuss with one of her acquaintance who is [place] orthopedic [place]. In the meantime she is advised to continue range of motion and rotator cuff strengthening home program.  May take over-the-counter anti-inflammatory as needed  Anxiety. However she is reluctant to take any medications. Reassurance provided  Peripheral venous insufficiency.   She is asymptomatic. Increase activity as tolerated  Postmenopausal syndrome. She may use KY gel for vaginal dryness. She would benefit from SSRI that she is reluctant to  Dyslipidemia being nonpharmacologically treated. She would benefit from low-fat high-fiber diet, daily moderate exercise  She declined COVID-19 vaccination  Further recommendations to follow labs  She is updated on mammography  History of hysterectomy  She is encouraged to call for any concern  This note is created with a voice recognition program and while intend to generate a document that accurately reflects the content of the visit, no guarantee can be provided that every mistake has been identified and corrected by editing.           Sanjay Leon MD

## 2021-09-27 ENCOUNTER — HOSPITAL ENCOUNTER (OUTPATIENT)
Dept: CT IMAGING | Age: 56
Discharge: HOME | End: 2021-09-29
Payer: COMMERCIAL

## 2021-09-27 DIAGNOSIS — R30.0 DYSURIA: ICD-10-CM

## 2021-09-27 PROCEDURE — 2580000003 HC RX 258: Performed by: OBSTETRICS & GYNECOLOGY

## 2021-09-27 PROCEDURE — 6360000004 HC RX CONTRAST MEDICATION: Performed by: OBSTETRICS & GYNECOLOGY

## 2021-09-27 PROCEDURE — 74178 CT ABD&PLV WO CNTR FLWD CNTR: CPT

## 2021-09-27 RX ORDER — 0.9 % SODIUM CHLORIDE 0.9 %
80 INTRAVENOUS SOLUTION INTRAVENOUS ONCE
Status: COMPLETED | OUTPATIENT
Start: 2021-09-27 | End: 2021-09-27

## 2021-09-27 RX ORDER — SODIUM CHLORIDE 0.9 % (FLUSH) 0.9 %
10 SYRINGE (ML) INJECTION PRN
Status: DISCONTINUED | OUTPATIENT
Start: 2021-09-27 | End: 2021-09-30 | Stop reason: HOSPADM

## 2021-09-27 RX ADMIN — IOPAMIDOL 125 ML: 755 INJECTION, SOLUTION INTRAVENOUS at 09:04

## 2021-09-27 RX ADMIN — SODIUM CHLORIDE, PRESERVATIVE FREE 10 ML: 5 INJECTION INTRAVENOUS at 09:04

## 2021-09-27 RX ADMIN — SODIUM CHLORIDE 80 ML: 9 INJECTION, SOLUTION INTRAVENOUS at 09:04

## 2021-11-05 ENCOUNTER — TELEPHONE (OUTPATIENT)
Dept: INTERNAL MEDICINE CLINIC | Age: 56
End: 2021-11-05

## 2021-11-05 RX ORDER — ONDANSETRON 4 MG/1
4 TABLET, FILM COATED ORAL 3 TIMES DAILY PRN
Qty: 20 TABLET | Refills: 0 | Status: SHIPPED | OUTPATIENT
Start: 2021-11-05 | End: 2022-01-17 | Stop reason: ALTCHOICE

## 2022-01-17 ENCOUNTER — OFFICE VISIT (OUTPATIENT)
Dept: INTERNAL MEDICINE CLINIC | Age: 57
End: 2022-01-17
Payer: COMMERCIAL

## 2022-01-17 VITALS
TEMPERATURE: 97.5 F | HEIGHT: 62 IN | DIASTOLIC BLOOD PRESSURE: 70 MMHG | OXYGEN SATURATION: 100 % | SYSTOLIC BLOOD PRESSURE: 118 MMHG | BODY MASS INDEX: 27.23 KG/M2 | WEIGHT: 148 LBS | HEART RATE: 58 BPM | RESPIRATION RATE: 16 BRPM

## 2022-01-17 DIAGNOSIS — I87.2 PERIPHERAL VENOUS INSUFFICIENCY: ICD-10-CM

## 2022-01-17 DIAGNOSIS — M75.81 ROTATOR CUFF TENDONITIS, RIGHT: ICD-10-CM

## 2022-01-17 DIAGNOSIS — N95.1 MENOPAUSAL VAGINAL DRYNESS: ICD-10-CM

## 2022-01-17 DIAGNOSIS — F41.9 ANXIETY: Primary | ICD-10-CM

## 2022-01-17 DIAGNOSIS — Z28.21 COVID-19 VACCINATION DECLINED: ICD-10-CM

## 2022-01-17 DIAGNOSIS — N81.10 POSTOPERATIVE PROLAPSE OF VAGINAL WALL, INITIAL ENCOUNTER: ICD-10-CM

## 2022-01-17 DIAGNOSIS — E78.2 MIXED HYPERLIPIDEMIA: ICD-10-CM

## 2022-01-17 DIAGNOSIS — Z28.21 INFLUENZA VACCINATION DECLINED: ICD-10-CM

## 2022-01-17 DIAGNOSIS — Z90.710 H/O: HYSTERECTOMY: ICD-10-CM

## 2022-01-17 DIAGNOSIS — T81.89XA POSTOPERATIVE PROLAPSE OF VAGINAL WALL, INITIAL ENCOUNTER: ICD-10-CM

## 2022-01-17 PROCEDURE — 99214 OFFICE O/P EST MOD 30 MIN: CPT | Performed by: FAMILY MEDICINE

## 2022-01-17 ASSESSMENT — ENCOUNTER SYMPTOMS
RESPIRATORY NEGATIVE: 1
GASTROINTESTINAL NEGATIVE: 1
ALLERGIC/IMMUNOLOGIC NEGATIVE: 1
EYES NEGATIVE: 1

## 2022-01-17 NOTE — PROGRESS NOTES
Subjective:      Patient ID: Hilary Nuñez is a 60 y.o. female. History of vaginal prolapse 2 weeks status post repair      Review of Systems   Constitutional: Negative. HENT: Negative. Eyes: Negative. Respiratory: Negative. Cardiovascular: Negative. Gastrointestinal: Negative. Endocrine: Negative. Musculoskeletal: Negative. Skin: Negative. Allergic/Immunologic: Negative. Neurological: Negative. Hematological: Negative. Psychiatric/Behavioral: The patient is nervous/anxious. Past family and social history unremarkable. Diagnosis Orders   1. Anxiety     2. Mixed hyperlipidemia     3. Influenza vaccination declined     4. H/O: hysterectomy     5. Peripheral venous insufficiency     6. Menopausal vaginal dryness     7. COVID-19 vaccination declined     8. Rotator cuff tendonitis, right     9. Postoperative prolapse of vaginal wall, initial encounter           Objective:   Physical Exam  Vitals and nursing note reviewed. Constitutional:       Appearance: She is well-developed. HENT:      Head: Normocephalic and atraumatic. Right Ear: External ear normal.      Left Ear: External ear normal.   Eyes:      Conjunctiva/sclera: Conjunctivae normal.      Pupils: Pupils are equal, round, and reactive to light. Cardiovascular:      Rate and Rhythm: Normal rate and regular rhythm. Heart sounds: Normal heart sounds. Comments: Dyslipidemia  Pulmonary:      Effort: Pulmonary effort is normal.      Breath sounds: Normal breath sounds. Abdominal:      General: Bowel sounds are normal.      Palpations: Abdomen is soft. Genitourinary:     Vagina: Normal.      Comments: History of vaginal prolapse status post repair. Stab wounds healing well  History of hysterectomy  Musculoskeletal:         General: Normal range of motion. Cervical back: Normal range of motion and neck supple.       Comments: Rotator cuff tendinitis  Asymptomatic venous insufficiency bilateral lower extremity   Skin:     General: Skin is warm and dry. Neurological:      Mental Status: She is alert and oriented to person, place, and time. Deep Tendon Reflexes: Reflexes are normal and symmetric. Psychiatric:      Comments: Anxiety  Declined influenza and COVID vaccination         Assessment:       Diagnosis Orders   1. Anxiety     2. Mixed hyperlipidemia     3. Influenza vaccination declined     4. H/O: hysterectomy     5. Peripheral venous insufficiency     6. Menopausal vaginal dryness     7. COVID-19 vaccination declined     8. Rotator cuff tendonitis, right     9. Postoperative prolapse of vaginal wall, initial encounter             Plan:      Postop follow-up. 47-year-old postmenopausal woman with a history of hysterectomy underwent robotic pelvic floor repair. Portals are healing well. She is tolerating p.o. well with good urine output and ambulating without any event. Anxiety. She denies being depressed. Reassurance provided  History of rotator cuff tendinopathy. She continues to have good function. May take over-the-counter Tylenol with sparing use of anti-inflammatory OTC. Peripheral venous insufficiency. Increase activity as tolerated, leg elevation  History of COVID resolved uneventfully. Dyslipidemia. She would benefit from low-fat high-fiber diet, daily moderate exercise  Once again she declined influenza and COVID vaccination  She denies tobacco, excessive alcohol or illicit drug use  Med list and available labs reviewed, discussed with patient, questions answered  She is encouraged to call for any concern  This note is created with a voice recognition program and while intend to generate a document that accurately reflects the content of the visit, no guarantee can be provided that every mistake has been identified and corrected by editing.           Katie Joyce MD

## 2022-07-18 ENCOUNTER — OFFICE VISIT (OUTPATIENT)
Dept: INTERNAL MEDICINE CLINIC | Age: 57
End: 2022-07-18
Payer: COMMERCIAL

## 2022-07-18 ENCOUNTER — HOSPITAL ENCOUNTER (OUTPATIENT)
Age: 57
Setting detail: SPECIMEN
Discharge: HOME | End: 2022-07-18

## 2022-07-18 VITALS
HEART RATE: 66 BPM | HEIGHT: 62 IN | DIASTOLIC BLOOD PRESSURE: 80 MMHG | TEMPERATURE: 97.9 F | RESPIRATION RATE: 16 BRPM | WEIGHT: 162 LBS | SYSTOLIC BLOOD PRESSURE: 110 MMHG | BODY MASS INDEX: 29.81 KG/M2 | OXYGEN SATURATION: 99 %

## 2022-07-18 DIAGNOSIS — R63.5 WEIGHT GAIN: ICD-10-CM

## 2022-07-18 DIAGNOSIS — Z90.710 H/O: HYSTERECTOMY: ICD-10-CM

## 2022-07-18 DIAGNOSIS — Z28.21 COVID-19 VACCINATION DECLINED: ICD-10-CM

## 2022-07-18 DIAGNOSIS — E78.2 MIXED HYPERLIPIDEMIA: ICD-10-CM

## 2022-07-18 DIAGNOSIS — N81.10 VAGINAL PROLAPSE: ICD-10-CM

## 2022-07-18 DIAGNOSIS — N95.1 MENOPAUSAL VAGINAL DRYNESS: ICD-10-CM

## 2022-07-18 DIAGNOSIS — Z28.21 INFLUENZA VACCINATION DECLINED: ICD-10-CM

## 2022-07-18 DIAGNOSIS — M75.81 ROTATOR CUFF TENDONITIS, RIGHT: ICD-10-CM

## 2022-07-18 DIAGNOSIS — U07.1 COVID: ICD-10-CM

## 2022-07-18 DIAGNOSIS — F41.9 ANXIETY: ICD-10-CM

## 2022-07-18 DIAGNOSIS — F41.9 ANXIETY: Primary | ICD-10-CM

## 2022-07-18 DIAGNOSIS — I87.2 PERIPHERAL VENOUS INSUFFICIENCY: ICD-10-CM

## 2022-07-18 PROBLEM — T81.89XA POSTOPERATIVE PROLAPSE OF VAGINAL WALL: Status: RESOLVED | Noted: 2022-01-17 | Resolved: 2022-07-18

## 2022-07-18 LAB
ALBUMIN SERPL-MCNC: 4.3 G/DL (ref 3.5–5.2)
ALBUMIN/GLOBULIN RATIO: 1.6 (ref 1–2.5)
ALP BLD-CCNC: 82 U/L (ref 35–104)
ALT SERPL-CCNC: 16 U/L (ref 5–33)
ANION GAP SERPL CALCULATED.3IONS-SCNC: 8 MMOL/L (ref 9–17)
AST SERPL-CCNC: 18 U/L
BILIRUB SERPL-MCNC: 1.47 MG/DL (ref 0.3–1.2)
BUN BLDV-MCNC: 15 MG/DL (ref 6–20)
CALCIUM SERPL-MCNC: 9 MG/DL (ref 8.6–10.4)
CHLORIDE BLD-SCNC: 105 MMOL/L (ref 98–107)
CHOLESTEROL/HDL RATIO: 2.7
CHOLESTEROL: 198 MG/DL
CO2: 27 MMOL/L (ref 20–31)
CREAT SERPL-MCNC: 0.58 MG/DL (ref 0.5–0.9)
ESTIMATED AVERAGE GLUCOSE: 111 MG/DL
GFR AFRICAN AMERICAN: >60 ML/MIN
GFR NON-AFRICAN AMERICAN: >60 ML/MIN
GFR SERPL CREATININE-BSD FRML MDRD: ABNORMAL ML/MIN/{1.73_M2}
GLUCOSE BLD-MCNC: 85 MG/DL (ref 70–99)
HBA1C MFR BLD: 5.5 % (ref 4–6)
HCT VFR BLD CALC: 43.5 % (ref 36.3–47.1)
HDLC SERPL-MCNC: 73 MG/DL
HEMOGLOBIN: 13.7 G/DL (ref 11.9–15.1)
LDL CHOLESTEROL: 111 MG/DL (ref 0–130)
MCH RBC QN AUTO: 28.5 PG (ref 25.2–33.5)
MCHC RBC AUTO-ENTMCNC: 31.5 G/DL (ref 28.4–34.8)
MCV RBC AUTO: 90.4 FL (ref 82.6–102.9)
NRBC AUTOMATED: 0 PER 100 WBC
PDW BLD-RTO: 13 % (ref 11.8–14.4)
PLATELET # BLD: 271 K/UL (ref 138–453)
PMV BLD AUTO: 10.1 FL (ref 8.1–13.5)
POTASSIUM SERPL-SCNC: 4 MMOL/L (ref 3.7–5.3)
RBC # BLD: 4.81 M/UL (ref 3.95–5.11)
SODIUM BLD-SCNC: 140 MMOL/L (ref 135–144)
TOTAL PROTEIN: 7 G/DL (ref 6.4–8.3)
TRIGL SERPL-MCNC: 71 MG/DL
TSH SERPL DL<=0.05 MIU/L-ACNC: 1.56 UIU/ML (ref 0.3–5)
WBC # BLD: 5 K/UL (ref 3.5–11.3)

## 2022-07-18 PROCEDURE — 99214 OFFICE O/P EST MOD 30 MIN: CPT | Performed by: FAMILY MEDICINE

## 2022-07-18 SDOH — ECONOMIC STABILITY: FOOD INSECURITY: WITHIN THE PAST 12 MONTHS, YOU WORRIED THAT YOUR FOOD WOULD RUN OUT BEFORE YOU GOT MONEY TO BUY MORE.: NEVER TRUE

## 2022-07-18 SDOH — ECONOMIC STABILITY: FOOD INSECURITY: WITHIN THE PAST 12 MONTHS, THE FOOD YOU BOUGHT JUST DIDN'T LAST AND YOU DIDN'T HAVE MONEY TO GET MORE.: NEVER TRUE

## 2022-07-18 ASSESSMENT — SOCIAL DETERMINANTS OF HEALTH (SDOH): HOW HARD IS IT FOR YOU TO PAY FOR THE VERY BASICS LIKE FOOD, HOUSING, MEDICAL CARE, AND HEATING?: NOT HARD AT ALL

## 2022-07-18 ASSESSMENT — ENCOUNTER SYMPTOMS
GASTROINTESTINAL NEGATIVE: 1
RESPIRATORY NEGATIVE: 1
EYES NEGATIVE: 1
ALLERGIC/IMMUNOLOGIC NEGATIVE: 1

## 2022-07-18 NOTE — PROGRESS NOTES
Subjective:      Patient ID: Herson Manley is a 60 y.o. female. Hyperlipidemia  This is a chronic problem. The current episode started more than 1 month ago. The problem is controlled. Recent lipid tests were reviewed and are variable. Factors aggravating her hyperlipidemia include fatty foods. She is currently on no antihyperlipidemic treatment. The current treatment provides no improvement of lipids. Compliance problems include adherence to exercise and psychosocial issues. Risk factors for coronary artery disease include stress, post-menopausal, obesity and dyslipidemia. Review of Systems   Constitutional: Negative. HENT: Negative. Eyes: Negative. Respiratory: Negative. Cardiovascular: Negative. Gastrointestinal: Negative. Endocrine: Negative. Musculoskeletal:  Positive for arthralgias. Skin: Negative. Allergic/Immunologic: Negative. Neurological: Negative. Hematological: Negative. Psychiatric/Behavioral:  The patient is nervous/anxious. Past family and social history unremarkable. Diagnosis Orders   1. Anxiety  CBC    Comprehensive Metabolic Panel    Hemoglobin A1C    Lipid Panel    TSH      2. Mixed hyperlipidemia  CBC    Comprehensive Metabolic Panel    Hemoglobin A1C    Lipid Panel    TSH      3. Influenza vaccination declined        4. H/O: hysterectomy        5. Peripheral venous insufficiency        6. Menopausal vaginal dryness  CBC    Comprehensive Metabolic Panel    Hemoglobin A1C    Lipid Panel    TSH      7. COVID-19 vaccination declined        8. Rotator cuff tendonitis, right        9. COVID        10. Vaginal prolapse        11. Weight gain            Objective:   Physical Exam  Vitals and nursing note reviewed. Constitutional:       Appearance: She is well-developed. Comments: Weight gain secondary to inactivity and eating unhealthy   HENT:      Head: Normocephalic and atraumatic.       Right Ear: External ear normal.      Left Ear: External ear normal.   Eyes:      Conjunctiva/sclera: Conjunctivae normal.      Pupils: Pupils are equal, round, and reactive to light. Cardiovascular:      Rate and Rhythm: Normal rate and regular rhythm. Heart sounds: Normal heart sounds. Comments: Dyslipidemia  Pulmonary:      Effort: Pulmonary effort is normal.      Breath sounds: Normal breath sounds. Comments: History of COVID with complete resolution  Abdominal:      General: Bowel sounds are normal.      Palpations: Abdomen is soft. Genitourinary:     Vagina: Normal.      Comments: History of vaginal repair/left secondary to prolapse  Musculoskeletal:         General: Normal range of motion. Cervical back: Normal range of motion and neck supple. Comments: Degenerative polyarthralgia   Skin:     General: Skin is warm and dry. Neurological:      Mental Status: She is alert and oriented to person, place, and time. Deep Tendon Reflexes: Reflexes are normal and symmetric. Psychiatric:         Judgment: Judgment normal.      Comments: Anxiety  Once again declined influenza, pneumococcal vaccine, COVID-vaccine       Assessment:       Diagnosis Orders   1. Anxiety  CBC    Comprehensive Metabolic Panel    Hemoglobin A1C    Lipid Panel    TSH      2. Mixed hyperlipidemia  CBC    Comprehensive Metabolic Panel    Hemoglobin A1C    Lipid Panel    TSH      3. Influenza vaccination declined        4. H/O: hysterectomy        5. Peripheral venous insufficiency        6. Menopausal vaginal dryness  CBC    Comprehensive Metabolic Panel    Hemoglobin A1C    Lipid Panel    TSH      7. COVID-19 vaccination declined        8. Rotator cuff tendonitis, right        9. COVID        10. Vaginal prolapse        11. Weight gain                Plan:      61-year-old  female returns for follow-up. She is afebrile hemodynamically stable, clinical examination is benign  Weight gain secondary to inactivity and eating unhealthy.   Underlying history of dyslipidemia. She would benefit from low-fat high-fiber diet, daily moderate exercise, lifestyle change and weight reduction to keep BMI around 25  Anxiety. She is reluctant to be medicated. Reassurance provided. She denies being depressed  She denies tobacco, excessive alcohol or illicit drug use  History of vaginal prolapse treated with vaginal lift by GYN. She is asymptomatic  Once again she declined influenza, COVID-vaccine, pneumococcal vaccine. History of COVID positivity x2. She does not appear to have any residual symptoms. Degenerative polyarthralgia. May take over-the-counter Tylenol with sparing use of anti-inflammatory  Asymptomatic peripheral venous insufficiency. Increase activity as tolerated, lose weight, leg elevation  Med list reviewed advised to continue  Further recommendations to follow updated labs  She is advised to update mammography  She is updated on colonoscopy  Call for any concern  This note is created with a voice recognition program and while intend to generate a document that accurately reflects the content of the visit, no guarantee can be provided that every mistake has been identified and corrected by editing.           Abhay Negro MD

## 2022-08-10 ENCOUNTER — OFFICE VISIT (OUTPATIENT)
Dept: INTERNAL MEDICINE CLINIC | Age: 57
End: 2022-08-10
Payer: COMMERCIAL

## 2022-08-10 VITALS
HEART RATE: 67 BPM | SYSTOLIC BLOOD PRESSURE: 120 MMHG | OXYGEN SATURATION: 99 % | WEIGHT: 162 LBS | DIASTOLIC BLOOD PRESSURE: 88 MMHG | HEIGHT: 63 IN | BODY MASS INDEX: 28.7 KG/M2

## 2022-08-10 DIAGNOSIS — N95.1 MENOPAUSAL VAGINAL DRYNESS: ICD-10-CM

## 2022-08-10 DIAGNOSIS — E78.2 MIXED HYPERLIPIDEMIA: ICD-10-CM

## 2022-08-10 DIAGNOSIS — Z28.21 INFLUENZA VACCINATION DECLINED: ICD-10-CM

## 2022-08-10 DIAGNOSIS — L50.9 HIVES: ICD-10-CM

## 2022-08-10 DIAGNOSIS — F41.9 ANXIETY: Primary | ICD-10-CM

## 2022-08-10 DIAGNOSIS — Z90.710 H/O: HYSTERECTOMY: ICD-10-CM

## 2022-08-10 DIAGNOSIS — Z28.21 COVID-19 VACCINATION DECLINED: ICD-10-CM

## 2022-08-10 DIAGNOSIS — U07.1 COVID: ICD-10-CM

## 2022-08-10 DIAGNOSIS — I87.2 PERIPHERAL VENOUS INSUFFICIENCY: ICD-10-CM

## 2022-08-10 PROCEDURE — 99214 OFFICE O/P EST MOD 30 MIN: CPT | Performed by: FAMILY MEDICINE

## 2022-08-10 ASSESSMENT — ENCOUNTER SYMPTOMS
URTICARIA: 1
RESPIRATORY NEGATIVE: 1
ALLERGIC/IMMUNOLOGIC NEGATIVE: 1
GASTROINTESTINAL NEGATIVE: 1
EYES NEGATIVE: 1

## 2022-08-10 NOTE — PROGRESS NOTES
Subjective:      Patient ID: Meena Penny is a 60 y.o. female. Urticaria  This is a recurrent problem. The current episode started more than 1 month ago. The problem has been waxing and waning since onset. The rash is diffuse. The rash is characterized by redness and itchiness. It is unknown if there was an exposure to a precipitant. Past treatments include antihistamine. The treatment provided moderate relief. Review of Systems   Constitutional: Negative. HENT: Negative. Eyes: Negative. Respiratory: Negative. Cardiovascular: Negative. Gastrointestinal: Negative. Endocrine: Negative. Musculoskeletal: Negative. Skin: Negative. Allergic/Immunologic: Negative. Neurological: Negative. Hematological: Negative. Psychiatric/Behavioral:  Positive for dysphoric mood and sleep disturbance. The patient is nervous/anxious. Past family and social history unremarkable. Diagnosis Orders   1. Anxiety        2. Mixed hyperlipidemia        3. COVID        4. Influenza vaccination declined        5. H/O: hysterectomy        6. Peripheral venous insufficiency        7. Menopausal vaginal dryness        8. COVID-19 vaccination declined        9. Hives  External Referral To Pediatric Immunology          Objective:   Physical Exam  Vitals and nursing note reviewed. Constitutional:       Appearance: She is well-developed. HENT:      Head: Normocephalic and atraumatic. Right Ear: External ear normal.      Left Ear: External ear normal.   Eyes:      Conjunctiva/sclera: Conjunctivae normal.      Pupils: Pupils are equal, round, and reactive to light. Cardiovascular:      Rate and Rhythm: Normal rate and regular rhythm. Heart sounds: Normal heart sounds. Comments: Dyslipidemia  Pulmonary:      Effort: Pulmonary effort is normal.      Breath sounds: Normal breath sounds. Abdominal:      General: Bowel sounds are normal.      Palpations: Abdomen is soft.    Genitourinary: Vagina: Normal.      Comments: History of hysterectomy  Musculoskeletal:         General: Normal range of motion. Cervical back: Normal range of motion and neck supple. Comments: Skeletal pain   Skin:     General: Skin is warm and dry. Comments: Hives NOS   Neurological:      Mental Status: She is alert and oriented to person, place, and time. Deep Tendon Reflexes: Reflexes are normal and symmetric. Psychiatric:         Judgment: Judgment normal.      Comments: Neurolyse anxiety disorder with panic attacks. Once again declined COVID vaccination despite 2 episodes of COVID positivity  She declines influenza vaccination       Assessment:       Diagnosis Orders   1. Anxiety        2. Mixed hyperlipidemia        3. COVID        4. Influenza vaccination declined        5. H/O: hysterectomy        6. Peripheral venous insufficiency        7. Menopausal vaginal dryness        8. COVID-19 vaccination declined        9. Hives  External Referral To Pediatric Immunology              Plan: This is ER follow-up. 38-year-old very anxious female was seen in the emergency room and treated for hives. Patient states that she works at a [employer], she also take care of the farm land. She states that she is exposed to sun and feels that she may be possibly allergic to peaches trees. States that she takes over-the-counter Benadryl and recently placed on Claritin. She wished to be investigated by an allergist.  Necessary arrangement will be made  History of significant anxiety with panic attack and somatization. In the past she has been placed on SSRI however he resist being on any psych medications. She also declined psych consultation. My personal feeling about this individual is that she is certainly need improvement with her mental health status. She denies suicidality, delusion or hallucination. She denies tobacco, excessive alcohol or illicit drug use  Musculoskeletal pain.   May take over-the-counter Tylenol with sparing use of anti-inflammatories  She had 2 episodes of COVID positivity that resolved uneventfully. She is still reluctant to COVID vaccinations as well as influenza vaccination  History of hysterectomy subjective complaint of vaginal dryness. She would benefit from SSRI. Further recommendations per her GYN  List and available labs reviewed, discussed with patient, questions answered  Dyslipidemia. She will benefit from low-fat high-fiber diet, daily moderate exercise and lifestyle change and weight reduction to keep BMI less than 25  Modest elevated bilirubin at 1.25. She is asymptomatic. Lose weight, low-fat high-fiber diet  She is encouraged to call for any concern  This note is created with a voice recognition program and while intend to generate a document that accurately reflects the content of the visit, no guarantee can be provided that every mistake has been identified and corrected by editing.           Ermelinda Sabillon MD

## 2022-08-19 ENCOUNTER — HOSPITAL ENCOUNTER (OUTPATIENT)
Age: 57
Setting detail: SPECIMEN
Discharge: HOME | End: 2022-08-19

## 2022-08-19 LAB
CORTISOL COLLECTION INFO: NORMAL
CORTISOL: 16.6 UG/DL (ref 2.7–18.4)
ESTRADIOL LEVEL: 17.3 PG/ML (ref 5–50)
GLUCOSE BLD-MCNC: 75 MG/DL (ref 70–99)
PROGESTERONE LEVEL: 0.21 NG/ML
TSH SERPL DL<=0.05 MIU/L-ACNC: 1.65 UIU/ML (ref 0.3–5)

## 2023-01-18 ENCOUNTER — OFFICE VISIT (OUTPATIENT)
Dept: INTERNAL MEDICINE CLINIC | Age: 58
End: 2023-01-18
Payer: COMMERCIAL

## 2023-01-18 VITALS
RESPIRATION RATE: 16 BRPM | BODY MASS INDEX: 28 KG/M2 | OXYGEN SATURATION: 99 % | DIASTOLIC BLOOD PRESSURE: 70 MMHG | TEMPERATURE: 97.1 F | HEART RATE: 69 BPM | HEIGHT: 63 IN | WEIGHT: 158 LBS | SYSTOLIC BLOOD PRESSURE: 122 MMHG

## 2023-01-18 DIAGNOSIS — I87.2 PERIPHERAL VENOUS INSUFFICIENCY: ICD-10-CM

## 2023-01-18 DIAGNOSIS — M75.81 ROTATOR CUFF TENDONITIS, RIGHT: ICD-10-CM

## 2023-01-18 DIAGNOSIS — F41.9 ANXIETY: Primary | ICD-10-CM

## 2023-01-18 DIAGNOSIS — N95.1 MENOPAUSAL VAGINAL DRYNESS: ICD-10-CM

## 2023-01-18 DIAGNOSIS — Z28.21 COVID-19 VACCINATION DECLINED: ICD-10-CM

## 2023-01-18 DIAGNOSIS — Z12.39 BREAST SCREENING: ICD-10-CM

## 2023-01-18 DIAGNOSIS — Z90.710 H/O: HYSTERECTOMY: ICD-10-CM

## 2023-01-18 DIAGNOSIS — Z28.21 INFLUENZA VACCINATION DECLINED: ICD-10-CM

## 2023-01-18 DIAGNOSIS — E78.2 MIXED HYPERLIPIDEMIA: ICD-10-CM

## 2023-01-18 PROBLEM — U07.1 COVID: Status: RESOLVED | Noted: 2021-11-09 | Resolved: 2023-01-18

## 2023-01-18 PROCEDURE — 99214 OFFICE O/P EST MOD 30 MIN: CPT | Performed by: FAMILY MEDICINE

## 2023-01-18 RX ORDER — ESTRADIOL 0.5 MG/1
TABLET ORAL
COMMUNITY
Start: 2022-11-18

## 2023-01-18 RX ORDER — AMITRIPTYLINE HYDROCHLORIDE 25 MG/1
25 TABLET, FILM COATED ORAL NIGHTLY
Qty: 90 TABLET | Refills: 1 | Status: SHIPPED | OUTPATIENT
Start: 2023-01-18

## 2023-01-18 ASSESSMENT — PATIENT HEALTH QUESTIONNAIRE - PHQ9
SUM OF ALL RESPONSES TO PHQ QUESTIONS 1-9: 0
SUM OF ALL RESPONSES TO PHQ QUESTIONS 1-9: 0
1. LITTLE INTEREST OR PLEASURE IN DOING THINGS: 0
2. FEELING DOWN, DEPRESSED OR HOPELESS: 0
SUM OF ALL RESPONSES TO PHQ QUESTIONS 1-9: 0
SUM OF ALL RESPONSES TO PHQ9 QUESTIONS 1 & 2: 0
SUM OF ALL RESPONSES TO PHQ QUESTIONS 1-9: 0

## 2023-01-18 NOTE — PROGRESS NOTES
Subjective:      Patient ID: Lyssa Conn is a 59 y.o. female. Mental Health Problem  The primary symptoms include dysphoric mood and somatic symptoms. The current episode started more than 1 month ago. This is a chronic problem. Somatic symptoms include fatigue, back pain and myalgias. The onset of the illness is precipitated by emotional stress. The degree of incapacity that she is experiencing as a consequence of her illness is mild. Additional symptoms of the illness include anhedonia, fatigue and agitation. She does not admit to suicidal ideas. She does not have a plan to attempt suicide. She does not contemplate harming herself. She has not already injured self. She does not contemplate injuring another person. She has not already  injured another person. Risk factors that are present for mental illness include a history of mental illness. Review of Systems   Constitutional:  Positive for fatigue. HENT: Negative. Eyes: Negative. Respiratory: Negative. Cardiovascular: Negative. Gastrointestinal: Negative. Endocrine: Negative. Musculoskeletal:  Positive for arthralgias, back pain, myalgias and neck pain. Skin: Negative. Allergic/Immunologic: Negative. Neurological: Negative. Hematological: Negative. Psychiatric/Behavioral:  Positive for agitation and dysphoric mood. The patient is nervous/anxious. Past family and social history unremarkable. Diagnosis Orders   1. Anxiety        2. Mixed hyperlipidemia        3. Influenza vaccination declined        4. H/O: hysterectomy        5. Peripheral venous insufficiency        6. Menopausal vaginal dryness        7. COVID-19 vaccination declined        8. Rotator cuff tendonitis, right        9. Breast screening  RAFAT DIGITAL SCREEN W OR WO CAD BILATERAL          Objective:   Physical Exam  Vitals and nursing note reviewed. Constitutional:       Appearance: She is well-developed.    HENT:      Head: Normocephalic and atraumatic. Right Ear: External ear normal.      Left Ear: External ear normal.   Eyes:      Conjunctiva/sclera: Conjunctivae normal.      Pupils: Pupils are equal, round, and reactive to light. Cardiovascular:      Rate and Rhythm: Normal rate and regular rhythm. Heart sounds: Normal heart sounds. Comments: Dyslipidemia  Pulmonary:      Effort: Pulmonary effort is normal.      Breath sounds: Normal breath sounds. Abdominal:      General: Bowel sounds are normal.      Palpations: Abdomen is soft. Genitourinary:     Vagina: Normal.      Comments: Postmenopausal vaginal dryness  Musculoskeletal:         General: Normal range of motion. Cervical back: Normal range of motion and neck supple. Comments: Musculoskeletal pain syndrome   Skin:     General: Skin is warm and dry. Neurological:      Mental Status: She is alert and oriented to person, place, and time. Deep Tendon Reflexes: Reflexes are normal and symmetric. Comments: Fibromyalgia   Psychiatric:         Judgment: Judgment normal.      Comments: Anxiety/depression with somatization       Assessment:       Diagnosis Orders   1. Anxiety        2. Mixed hyperlipidemia        3. Influenza vaccination declined        4. H/O: hysterectomy        5. Peripheral venous insufficiency        6. Menopausal vaginal dryness        7. COVID-19 vaccination declined        8. Rotator cuff tendonitis, right        9. Breast screening  RAFAT DIGITAL SCREEN W OR WO CAD BILATERAL              Plan:      47-year-old  female with known history of anxiety and depression who has been reluctant to all intervention is presented today requesting to be placed on antidepressant. Patient states that in view of chronic sleep deprivation she is having cycle of muscle stiffness, chronic fatigue, poor judgment. She will be placed on Elavil.   She is strongly suggested to establish and follow closely with mental health services that she declined  Peripheral venous insufficiency. Increase activity as tolerated. Leg elevation  Dyslipidemia. Nonpharmacological measures including low-fat high-fiber diet, daily moderate exercise and weight reduction to keep BMI below 25  Postmenopausal vaginal dryness. She is on Estrace per GYN  She denies tobacco, excessive alcohol or illicit drug use  Rotator cuff tendinitis. Increase range of motion as tolerated. Stress reduction, sparing use of over-the-counter pain medications  History of hysterectomy  Once again she declined COVID vaccination, influenza vaccination  She denies tobacco, excessive alcohol or illicit drug use  Available labs reviewed, discussed with patient, questions answered  She is encouraged to call for any concern  This note is created with a voice recognition program and while intend to generate a document that accurately reflects the content of the visit, no guarantee can be provided that every mistake has been identified and corrected by editing.           Williams Jones MD

## 2023-01-19 ASSESSMENT — ENCOUNTER SYMPTOMS
BACK PAIN: 1
ALLERGIC/IMMUNOLOGIC NEGATIVE: 1
RESPIRATORY NEGATIVE: 1
EYES NEGATIVE: 1
GASTROINTESTINAL NEGATIVE: 1

## 2023-03-01 ENCOUNTER — OFFICE VISIT (OUTPATIENT)
Dept: INTERNAL MEDICINE CLINIC | Age: 58
End: 2023-03-01
Payer: COMMERCIAL

## 2023-03-01 VITALS
HEART RATE: 77 BPM | SYSTOLIC BLOOD PRESSURE: 120 MMHG | RESPIRATION RATE: 16 BRPM | WEIGHT: 160 LBS | OXYGEN SATURATION: 96 % | HEIGHT: 63 IN | BODY MASS INDEX: 28.35 KG/M2 | DIASTOLIC BLOOD PRESSURE: 80 MMHG | TEMPERATURE: 97 F

## 2023-03-01 DIAGNOSIS — F41.9 ANXIETY: Primary | ICD-10-CM

## 2023-03-01 DIAGNOSIS — N95.1 MENOPAUSAL VAGINAL DRYNESS: ICD-10-CM

## 2023-03-01 DIAGNOSIS — Z28.21 COVID-19 VACCINATION DECLINED: ICD-10-CM

## 2023-03-01 DIAGNOSIS — I87.2 PERIPHERAL VENOUS INSUFFICIENCY: ICD-10-CM

## 2023-03-01 DIAGNOSIS — Z90.710 H/O: HYSTERECTOMY: ICD-10-CM

## 2023-03-01 DIAGNOSIS — M75.81 ROTATOR CUFF TENDONITIS, RIGHT: ICD-10-CM

## 2023-03-01 DIAGNOSIS — Z28.21 INFLUENZA VACCINATION DECLINED: ICD-10-CM

## 2023-03-01 DIAGNOSIS — E78.2 MIXED HYPERLIPIDEMIA: ICD-10-CM

## 2023-03-01 PROCEDURE — 99214 OFFICE O/P EST MOD 30 MIN: CPT | Performed by: FAMILY MEDICINE

## 2023-03-01 SDOH — ECONOMIC STABILITY: INCOME INSECURITY: HOW HARD IS IT FOR YOU TO PAY FOR THE VERY BASICS LIKE FOOD, HOUSING, MEDICAL CARE, AND HEATING?: NOT HARD AT ALL

## 2023-03-01 SDOH — ECONOMIC STABILITY: FOOD INSECURITY: WITHIN THE PAST 12 MONTHS, THE FOOD YOU BOUGHT JUST DIDN'T LAST AND YOU DIDN'T HAVE MONEY TO GET MORE.: NEVER TRUE

## 2023-03-01 SDOH — ECONOMIC STABILITY: FOOD INSECURITY: WITHIN THE PAST 12 MONTHS, YOU WORRIED THAT YOUR FOOD WOULD RUN OUT BEFORE YOU GOT MONEY TO BUY MORE.: NEVER TRUE

## 2023-03-01 SDOH — ECONOMIC STABILITY: HOUSING INSECURITY
IN THE LAST 12 MONTHS, WAS THERE A TIME WHEN YOU DID NOT HAVE A STEADY PLACE TO SLEEP OR SLEPT IN A SHELTER (INCLUDING NOW)?: NO

## 2023-03-01 NOTE — PROGRESS NOTES
Subjective:      Patient ID: Chanel Robin is a 59 y.o. female. 61-year-old anxious female with underlying history significant for restless leg syndrome is present for follow-up. Positive response to recent addition of Elavil      Review of Systems   Musculoskeletal:  Positive for arthralgias. Psychiatric/Behavioral:  Positive for dysphoric mood and sleep disturbance. The patient is nervous/anxious. Past family and social history unremarkable. Diagnosis Orders   1. Anxiety        2. Mixed hyperlipidemia        3. Influenza vaccination declined        4. H/O: hysterectomy        5. Peripheral venous insufficiency        6. Menopausal vaginal dryness        7. COVID-19 vaccination declined        8. Rotator cuff tendonitis, right            Objective:   Physical Exam  Vitals and nursing note reviewed. Constitutional:       Appearance: She is well-developed. HENT:      Head: Normocephalic and atraumatic. Right Ear: External ear normal.      Left Ear: External ear normal.   Eyes:      Conjunctiva/sclera: Conjunctivae normal.      Pupils: Pupils are equal, round, and reactive to light. Cardiovascular:      Rate and Rhythm: Normal rate and regular rhythm. Heart sounds: Normal heart sounds. Comments: Dyslipidemia  Pulmonary:      Effort: Pulmonary effort is normal.      Breath sounds: Normal breath sounds. Abdominal:      General: Bowel sounds are normal.      Palpations: Abdomen is soft. Genitourinary:     Vagina: Normal.      Comments: Postmenopausal syndrome on Estrace p.o. by GYN  Musculoskeletal:         General: Normal range of motion. Cervical back: Normal range of motion and neck supple. Comments: Musculoskeletal pain   Skin:     General: Skin is warm and dry. Neurological:      Mental Status: She is alert and oriented to person, place, and time. Deep Tendon Reflexes: Reflexes are normal and symmetric.       Comments: Restless leg syndrome   Psychiatric: Judgment: Judgment normal.      Comments: Anxiety/depression       Assessment:       Diagnosis Orders   1. Anxiety        2. Mixed hyperlipidemia        3. Influenza vaccination declined        4. H/O: hysterectomy        5. Peripheral venous insufficiency        6. Menopausal vaginal dryness        7. COVID-19 vaccination declined        8. Rotator cuff tendonitis, right                Plan:      49-year-old female returns for follow-up. She is afebrile hemodynamically stable, clinical examination is benign  Anxiety/depression  Restless leg syndrome  Patient states that since recent addition of Elavil 25 mg p.o. daily she has substantially improved to her satisfaction. She wished to continue current dosing. Reassurance provided. Improve sleep hygiene, daily moderate exercise  Postmenopausal syndrome. She is on Estrace p.o. by GYN that she is tolerating well  Musculoskeletal pain. May take Tylenol with sparing use of anti-inflammatories. History of hysterectomy  Peripheral venous insufficiency. Increase activity as tolerated, leg elevation  Dyslipidemia. She would benefit from low-fat high-fiber diet, daily moderate exercise  Once again she declined influenza, pneumococcal vaccine  Patient is advised to update mammography. She states that she was contacted by radiology to schedule an appointment  She is updated on colonoscopy  She denies tobacco, excessive alcohol or illicit drug use  Med list and available labs reviewed, discussed with patient, questions answered  She is encouraged to call for any concern  This note is created with a voice recognition program and while intend to generate a document that accurately reflects the content of the visit, no guarantee can be provided that every mistake has been identified and corrected by editing.           Kat Lema MD

## 2023-05-25 ENCOUNTER — HOSPITAL ENCOUNTER (OUTPATIENT)
Dept: MAMMOGRAPHY | Age: 58
Discharge: HOME | End: 2023-05-27
Payer: COMMERCIAL

## 2023-05-25 DIAGNOSIS — Z12.31 SCREENING MAMMOGRAM FOR BREAST CANCER: ICD-10-CM

## 2023-05-25 PROCEDURE — 77063 BREAST TOMOSYNTHESIS BI: CPT

## 2023-07-06 ENCOUNTER — HOSPITAL ENCOUNTER (OUTPATIENT)
Age: 58
Setting detail: SPECIMEN
Discharge: HOME | End: 2023-07-06

## 2023-07-06 ENCOUNTER — OFFICE VISIT (OUTPATIENT)
Dept: INTERNAL MEDICINE CLINIC | Age: 58
End: 2023-07-06
Payer: COMMERCIAL

## 2023-07-06 VITALS
RESPIRATION RATE: 10 BRPM | TEMPERATURE: 97 F | HEART RATE: 56 BPM | HEIGHT: 63 IN | WEIGHT: 150 LBS | SYSTOLIC BLOOD PRESSURE: 116 MMHG | OXYGEN SATURATION: 98 % | BODY MASS INDEX: 26.58 KG/M2 | DIASTOLIC BLOOD PRESSURE: 76 MMHG

## 2023-07-06 DIAGNOSIS — E78.2 MIXED HYPERLIPIDEMIA: ICD-10-CM

## 2023-07-06 DIAGNOSIS — M75.81 ROTATOR CUFF TENDONITIS, RIGHT: ICD-10-CM

## 2023-07-06 DIAGNOSIS — N95.1 MENOPAUSAL VAGINAL DRYNESS: ICD-10-CM

## 2023-07-06 DIAGNOSIS — Z28.21 INFLUENZA VACCINATION DECLINED: ICD-10-CM

## 2023-07-06 DIAGNOSIS — Z90.710 H/O: HYSTERECTOMY: ICD-10-CM

## 2023-07-06 DIAGNOSIS — Z28.21 COVID-19 VACCINATION DECLINED: ICD-10-CM

## 2023-07-06 DIAGNOSIS — F41.9 ANXIETY: ICD-10-CM

## 2023-07-06 DIAGNOSIS — I87.2 PERIPHERAL VENOUS INSUFFICIENCY: ICD-10-CM

## 2023-07-06 DIAGNOSIS — F41.9 ANXIETY: Primary | ICD-10-CM

## 2023-07-06 LAB
25(OH)D3 SERPL-MCNC: 68.4 NG/ML
ALBUMIN SERPL-MCNC: 4.3 G/DL (ref 3.5–5.2)
ALBUMIN/GLOB SERPL: 1.7 {RATIO} (ref 1–2.5)
ALP SERPL-CCNC: 60 U/L (ref 35–104)
ALT SERPL-CCNC: 14 U/L (ref 5–33)
ANION GAP SERPL CALCULATED.3IONS-SCNC: 13 MMOL/L (ref 9–17)
AST SERPL-CCNC: 17 U/L
BILIRUB SERPL-MCNC: 0.9 MG/DL (ref 0.3–1.2)
BUN SERPL-MCNC: 12 MG/DL (ref 6–20)
CALCIUM SERPL-MCNC: 9.1 MG/DL (ref 8.6–10.4)
CHLORIDE SERPL-SCNC: 106 MMOL/L (ref 98–107)
CHOLEST SERPL-MCNC: 175 MG/DL
CHOLESTEROL/HDL RATIO: 2.5
CO2 SERPL-SCNC: 24 MMOL/L (ref 20–31)
CREAT SERPL-MCNC: 0.56 MG/DL (ref 0.5–0.9)
ERYTHROCYTE [DISTWIDTH] IN BLOOD BY AUTOMATED COUNT: 13.4 % (ref 11.8–14.4)
EST. AVERAGE GLUCOSE BLD GHB EST-MCNC: 108 MG/DL
GFR SERPL CREATININE-BSD FRML MDRD: >60 ML/MIN/1.73M2
GLUCOSE SERPL-MCNC: 87 MG/DL (ref 70–99)
HBA1C MFR BLD: 5.4 % (ref 4–6)
HCT VFR BLD AUTO: 43.2 % (ref 36.3–47.1)
HDLC SERPL-MCNC: 69 MG/DL
HGB BLD-MCNC: 13.8 G/DL (ref 11.9–15.1)
LDLC SERPL CALC-MCNC: 90 MG/DL (ref 0–130)
MCH RBC QN AUTO: 28.3 PG (ref 25.2–33.5)
MCHC RBC AUTO-ENTMCNC: 31.9 G/DL (ref 28.4–34.8)
MCV RBC AUTO: 88.5 FL (ref 82.6–102.9)
NRBC BLD-RTO: 0 PER 100 WBC
PLATELET # BLD AUTO: 261 K/UL (ref 138–453)
PMV BLD AUTO: 10.9 FL (ref 8.1–13.5)
POTASSIUM SERPL-SCNC: 3.9 MMOL/L (ref 3.7–5.3)
PROT SERPL-MCNC: 6.9 G/DL (ref 6.4–8.3)
RBC # BLD AUTO: 4.88 M/UL (ref 3.95–5.11)
SODIUM SERPL-SCNC: 143 MMOL/L (ref 135–144)
TRIGL SERPL-MCNC: 78 MG/DL
TSH SERPL DL<=0.05 MIU/L-ACNC: 1.95 UIU/ML (ref 0.3–5)
WBC OTHER # BLD: 5.1 K/UL (ref 3.5–11.3)

## 2023-07-06 PROCEDURE — 99214 OFFICE O/P EST MOD 30 MIN: CPT | Performed by: FAMILY MEDICINE

## 2023-07-06 RX ORDER — ESTRADIOL 1 MG/1
TABLET ORAL
COMMUNITY
Start: 2023-05-22

## 2023-07-06 NOTE — PROGRESS NOTES
Subjective:      Patient ID: Janet Turcios is a 59 y.o. female. Mental Health Problem  The primary symptoms include dysphoric mood. The current episode started more than 1 month ago. This is a chronic problem. The onset of the illness is precipitated by emotional stress. The degree of incapacity that she is experiencing as a consequence of her illness is mild. Additional symptoms of the illness include anhedonia. She does not admit to suicidal ideas. She does not have a plan to attempt suicide. She does not contemplate harming herself. She has not already injured self. She does not contemplate injuring another person. She has not already  injured another person. Risk factors that are present for mental illness include a history of mental illness. Review of Systems   Constitutional: Negative. HENT: Negative. Eyes: Negative. Respiratory: Negative. Cardiovascular: Negative. Gastrointestinal: Negative. Endocrine: Negative. Musculoskeletal:  Positive for arthralgias. Skin: Negative. Allergic/Immunologic: Negative. Neurological: Negative. Hematological: Negative. Psychiatric/Behavioral:  Positive for dysphoric mood. Past family and social history unremarkable. Diagnosis Orders   1. Anxiety  CBC    Comprehensive Metabolic Panel    Hemoglobin A1C    Lipid Panel    TSH    Vitamin D 25 Hydroxy      2. Mixed hyperlipidemia  CBC    Comprehensive Metabolic Panel    Hemoglobin A1C    Lipid Panel    TSH    Vitamin D 25 Hydroxy      3. Influenza vaccination declined        4. H/O: hysterectomy        5. Peripheral venous insufficiency  CBC    Comprehensive Metabolic Panel    Hemoglobin A1C    Lipid Panel    TSH    Vitamin D 25 Hydroxy      6. Menopausal vaginal dryness  CBC    Comprehensive Metabolic Panel    Hemoglobin A1C    Lipid Panel    TSH    Vitamin D 25 Hydroxy      7. COVID-19 vaccination declined        8.  Rotator cuff tendonitis, right            Objective:   Physical

## 2023-07-17 RX ORDER — AMITRIPTYLINE HYDROCHLORIDE 25 MG/1
TABLET, FILM COATED ORAL
Qty: 90 TABLET | Refills: 1 | Status: SHIPPED | OUTPATIENT
Start: 2023-07-17

## 2023-07-17 NOTE — TELEPHONE ENCOUNTER
[facility] is calling to request a refill on the following medication(s):    Medication Request:  Requested Prescriptions     Pending Prescriptions Disp Refills    amitriptyline (ELAVIL) 25 MG tablet [Pharmacy Med Name: AMITRIPTYLINE HCL 25 MG TAB] 90 tablet 1     Sig: TAKE ONE TABLET BY MOUTH ONCE NIGHTLY       Last Visit Date (If Applicable):  7/7/1904    Next Visit Date:    1/5/2024      Last refill 1/18/23. Prescription pending.

## 2024-01-05 ENCOUNTER — OFFICE VISIT (OUTPATIENT)
Dept: INTERNAL MEDICINE CLINIC | Age: 59
End: 2024-01-05
Payer: COMMERCIAL

## 2024-01-05 VITALS
SYSTOLIC BLOOD PRESSURE: 118 MMHG | HEIGHT: 63 IN | HEART RATE: 52 BPM | BODY MASS INDEX: 24.98 KG/M2 | OXYGEN SATURATION: 96 % | RESPIRATION RATE: 10 BRPM | DIASTOLIC BLOOD PRESSURE: 70 MMHG | WEIGHT: 141 LBS | TEMPERATURE: 97.4 F

## 2024-01-05 DIAGNOSIS — I87.2 PERIPHERAL VENOUS INSUFFICIENCY: ICD-10-CM

## 2024-01-05 DIAGNOSIS — N95.1 MENOPAUSAL VAGINAL DRYNESS: ICD-10-CM

## 2024-01-05 DIAGNOSIS — Z28.21 INFLUENZA VACCINATION DECLINED: ICD-10-CM

## 2024-01-05 DIAGNOSIS — N95.1 POSTMENOPAUSAL SYNDROME: ICD-10-CM

## 2024-01-05 DIAGNOSIS — Z90.710 H/O: HYSTERECTOMY: ICD-10-CM

## 2024-01-05 DIAGNOSIS — Z28.21 COVID-19 VACCINATION DECLINED: ICD-10-CM

## 2024-01-05 DIAGNOSIS — M75.81 ROTATOR CUFF TENDONITIS, RIGHT: ICD-10-CM

## 2024-01-05 DIAGNOSIS — E78.2 MIXED HYPERLIPIDEMIA: ICD-10-CM

## 2024-01-05 DIAGNOSIS — F41.9 ANXIETY: Primary | ICD-10-CM

## 2024-01-05 PROCEDURE — 99214 OFFICE O/P EST MOD 30 MIN: CPT | Performed by: FAMILY MEDICINE

## 2024-01-05 ASSESSMENT — PATIENT HEALTH QUESTIONNAIRE - PHQ9
SUM OF ALL RESPONSES TO PHQ QUESTIONS 1-9: 0
1. LITTLE INTEREST OR PLEASURE IN DOING THINGS: 0
SUM OF ALL RESPONSES TO PHQ QUESTIONS 1-9: 0
2. FEELING DOWN, DEPRESSED OR HOPELESS: 0
SUM OF ALL RESPONSES TO PHQ9 QUESTIONS 1 & 2: 0

## 2024-07-08 ENCOUNTER — HOSPITAL ENCOUNTER (OUTPATIENT)
Age: 59
Setting detail: SPECIMEN
Discharge: HOME | End: 2024-07-08

## 2024-07-08 ENCOUNTER — OFFICE VISIT (OUTPATIENT)
Dept: FAMILY MEDICINE CLINIC | Age: 59
End: 2024-07-08
Payer: COMMERCIAL

## 2024-07-08 VITALS
TEMPERATURE: 97.1 F | OXYGEN SATURATION: 98 % | HEIGHT: 63 IN | SYSTOLIC BLOOD PRESSURE: 110 MMHG | RESPIRATION RATE: 12 BRPM | BODY MASS INDEX: 25.45 KG/M2 | DIASTOLIC BLOOD PRESSURE: 74 MMHG | WEIGHT: 143.6 LBS | HEART RATE: 55 BPM

## 2024-07-08 DIAGNOSIS — E78.2 MIXED HYPERLIPIDEMIA: ICD-10-CM

## 2024-07-08 DIAGNOSIS — N95.1 MENOPAUSAL VAGINAL DRYNESS: ICD-10-CM

## 2024-07-08 DIAGNOSIS — Z90.710 H/O: HYSTERECTOMY: ICD-10-CM

## 2024-07-08 DIAGNOSIS — I87.2 PERIPHERAL VENOUS INSUFFICIENCY: ICD-10-CM

## 2024-07-08 DIAGNOSIS — Z28.21 COVID-19 VACCINATION DECLINED: ICD-10-CM

## 2024-07-08 DIAGNOSIS — M75.81 ROTATOR CUFF TENDONITIS, RIGHT: ICD-10-CM

## 2024-07-08 DIAGNOSIS — F41.9 ANXIETY: Primary | ICD-10-CM

## 2024-07-08 DIAGNOSIS — N95.1 POSTMENOPAUSAL SYNDROME: ICD-10-CM

## 2024-07-08 DIAGNOSIS — N81.10 FEMALE BLADDER PROLAPSE: ICD-10-CM

## 2024-07-08 DIAGNOSIS — Z28.21 INFLUENZA VACCINATION DECLINED: ICD-10-CM

## 2024-07-08 PROBLEM — N89.8 VAGINAL DRYNESS: Status: ACTIVE | Noted: 2020-06-19

## 2024-07-08 LAB
CHOLEST SERPL-MCNC: 213 MG/DL (ref 0–199)
CHOLESTEROL/HDL RATIO: 2
ERYTHROCYTE [DISTWIDTH] IN BLOOD BY AUTOMATED COUNT: 13 % (ref 11.8–14.4)
EST. AVERAGE GLUCOSE BLD GHB EST-MCNC: 105 MG/DL
HBA1C MFR BLD: 5.3 % (ref 4–6)
HCT VFR BLD AUTO: 42.7 % (ref 36.3–47.1)
HDLC SERPL-MCNC: 94 MG/DL
HGB BLD-MCNC: 13.8 G/DL (ref 11.9–15.1)
LDLC SERPL CALC-MCNC: 105 MG/DL (ref 0–100)
MCH RBC QN AUTO: 28.6 PG (ref 25.2–33.5)
MCHC RBC AUTO-ENTMCNC: 32.3 G/DL (ref 28.4–34.8)
MCV RBC AUTO: 88.6 FL (ref 82.6–102.9)
NRBC BLD-RTO: 0 PER 100 WBC
PLATELET # BLD AUTO: 275 K/UL (ref 138–453)
PMV BLD AUTO: 10.3 FL (ref 8.1–13.5)
RBC # BLD AUTO: 4.82 M/UL (ref 3.95–5.11)
TRIGL SERPL-MCNC: 71 MG/DL
TSH SERPL DL<=0.05 MIU/L-ACNC: 1.85 UIU/ML (ref 0.27–4.2)
VLDLC SERPL CALC-MCNC: 14 MG/DL
WBC OTHER # BLD: 4.9 K/UL (ref 3.5–11.3)

## 2024-07-08 PROCEDURE — 99214 OFFICE O/P EST MOD 30 MIN: CPT | Performed by: FAMILY MEDICINE

## 2024-07-08 SDOH — ECONOMIC STABILITY: FOOD INSECURITY: WITHIN THE PAST 12 MONTHS, THE FOOD YOU BOUGHT JUST DIDN'T LAST AND YOU DIDN'T HAVE MONEY TO GET MORE.: NEVER TRUE

## 2024-07-08 SDOH — ECONOMIC STABILITY: FOOD INSECURITY: WITHIN THE PAST 12 MONTHS, YOU WORRIED THAT YOUR FOOD WOULD RUN OUT BEFORE YOU GOT MONEY TO BUY MORE.: NEVER TRUE

## 2024-07-08 SDOH — ECONOMIC STABILITY: INCOME INSECURITY: HOW HARD IS IT FOR YOU TO PAY FOR THE VERY BASICS LIKE FOOD, HOUSING, MEDICAL CARE, AND HEATING?: NOT HARD AT ALL

## 2024-07-08 ASSESSMENT — ENCOUNTER SYMPTOMS
BACK PAIN: 1
GASTROINTESTINAL NEGATIVE: 1
RESPIRATORY NEGATIVE: 1
EYES NEGATIVE: 1
ALLERGIC/IMMUNOLOGIC NEGATIVE: 1

## 2024-07-08 NOTE — PROGRESS NOTES
Subjective:      Patient ID: Kyra Murry is a 58 y.o. female.    Hyperlipidemia  This is a chronic problem. The current episode started more than 1 month ago. The problem is controlled. Recent lipid tests were reviewed and are variable. Current antihyperlipidemic treatment includes diet change and exercise. The current treatment provides moderate improvement of lipids. There are no compliance problems.  Risk factors for coronary artery disease include stress, post-menopausal and dyslipidemia.       Review of Systems   Constitutional: Negative.    HENT: Negative.     Eyes: Negative.    Respiratory: Negative.     Cardiovascular: Negative.    Gastrointestinal: Negative.    Endocrine: Negative.    Musculoskeletal:  Positive for arthralgias and back pain.   Skin: Negative.    Allergic/Immunologic: Negative.    Neurological: Negative.    Hematological: Negative.    Psychiatric/Behavioral:  The patient is nervous/anxious.    Past family and social history unremarkable.   Diagnosis Orders   1. Anxiety        2. Mixed hyperlipidemia  CBC    Comprehensive Metabolic Panel    Hemoglobin A1C    Lipid Panel    TSH      3. Influenza vaccination declined        4. H/O: hysterectomy        5. Peripheral venous insufficiency  CBC    Comprehensive Metabolic Panel    Hemoglobin A1C    Lipid Panel    TSH      6. Menopausal vaginal dryness        7. COVID-19 vaccination declined        8. Rotator cuff tendonitis, right  CBC    Comprehensive Metabolic Panel    Hemoglobin A1C    Lipid Panel    TSH      9. Postmenopausal syndrome        10. Female bladder prolapse              Objective:   Physical Exam  Vitals and nursing note reviewed.   Constitutional:       Appearance: She is well-developed.   HENT:      Head: Normocephalic and atraumatic.      Right Ear: External ear normal.      Left Ear: External ear normal.   Eyes:      Conjunctiva/sclera: Conjunctivae normal.      Pupils: Pupils are equal, round, and reactive to light.

## 2024-07-09 LAB
ALBUMIN SERPL-MCNC: 4.8 G/DL (ref 3.5–5.2)
ALBUMIN/GLOB SERPL: 2 {RATIO} (ref 1–2.5)
ALP SERPL-CCNC: 58 U/L (ref 35–104)
ALT SERPL-CCNC: 15 U/L (ref 10–35)
ANION GAP SERPL CALCULATED.3IONS-SCNC: 13 MMOL/L (ref 9–16)
AST SERPL-CCNC: 20 U/L (ref 10–35)
BILIRUB SERPL-MCNC: 0.9 MG/DL (ref 0–1.2)
BUN SERPL-MCNC: 16 MG/DL (ref 8–23)
CALCIUM SERPL-MCNC: 9.3 MG/DL (ref 8.6–10.4)
CHLORIDE SERPL-SCNC: 106 MMOL/L (ref 98–107)
CO2 SERPL-SCNC: 25 MMOL/L (ref 20–31)
CREAT SERPL-MCNC: 0.7 MG/DL (ref 0.5–0.9)
GFR, ESTIMATED: >90 ML/MIN/1.73M2
GLUCOSE SERPL-MCNC: 81 MG/DL (ref 74–99)
POTASSIUM SERPL-SCNC: 4 MMOL/L (ref 3.7–5.3)
PROT SERPL-MCNC: 7.1 G/DL (ref 6.6–8.7)
SODIUM SERPL-SCNC: 144 MMOL/L (ref 136–145)

## 2024-11-20 RX ORDER — ESTRADIOL 1 MG/1
1 TABLET ORAL DAILY
Qty: 30 TABLET | Refills: 4 | Status: SHIPPED | OUTPATIENT
Start: 2024-11-20

## 2024-11-20 RX ORDER — ESTRADIOL 1 MG/1
TABLET ORAL
Qty: 21 TABLET | Status: CANCELLED | OUTPATIENT
Start: 2024-11-20

## 2024-11-20 NOTE — TELEPHONE ENCOUNTER
Kyra Murry is calling to request a refill on the following medication(s):    Medication Request:  Requested Prescriptions      No prescriptions requested or ordered in this encounter       Last Visit Date (If Applicable):  Visit date not found    Next Visit Date:    Visit date not found

## 2025-01-27 ENCOUNTER — OFFICE VISIT (OUTPATIENT)
Dept: FAMILY MEDICINE CLINIC | Age: 60
End: 2025-01-27
Payer: COMMERCIAL

## 2025-01-27 VITALS
BODY MASS INDEX: 24.73 KG/M2 | SYSTOLIC BLOOD PRESSURE: 110 MMHG | TEMPERATURE: 98.4 F | HEART RATE: 61 BPM | DIASTOLIC BLOOD PRESSURE: 64 MMHG | RESPIRATION RATE: 12 BRPM | HEIGHT: 63 IN | OXYGEN SATURATION: 100 % | WEIGHT: 139.6 LBS

## 2025-01-27 DIAGNOSIS — Z28.21 COVID-19 VACCINATION DECLINED: ICD-10-CM

## 2025-01-27 DIAGNOSIS — Z28.21 INFLUENZA VACCINATION DECLINED: ICD-10-CM

## 2025-01-27 DIAGNOSIS — E78.2 MIXED HYPERLIPIDEMIA: ICD-10-CM

## 2025-01-27 DIAGNOSIS — N89.8 VAGINAL DRYNESS: ICD-10-CM

## 2025-01-27 DIAGNOSIS — M75.81 TENDINITIS OF RIGHT ROTATOR CUFF: ICD-10-CM

## 2025-01-27 DIAGNOSIS — Z90.710 H/O: HYSTERECTOMY: ICD-10-CM

## 2025-01-27 DIAGNOSIS — N95.1 POSTMENOPAUSAL SYNDROME: ICD-10-CM

## 2025-01-27 DIAGNOSIS — I87.2 PERIPHERAL VENOUS INSUFFICIENCY: ICD-10-CM

## 2025-01-27 DIAGNOSIS — N81.10 FEMALE BLADDER PROLAPSE: ICD-10-CM

## 2025-01-27 DIAGNOSIS — L85.3 XEROSIS CUTIS: ICD-10-CM

## 2025-01-27 DIAGNOSIS — F41.9 ANXIETY: Primary | ICD-10-CM

## 2025-01-27 PROCEDURE — 99214 OFFICE O/P EST MOD 30 MIN: CPT | Performed by: FAMILY MEDICINE

## 2025-01-27 SDOH — ECONOMIC STABILITY: FOOD INSECURITY: WITHIN THE PAST 12 MONTHS, YOU WORRIED THAT YOUR FOOD WOULD RUN OUT BEFORE YOU GOT MONEY TO BUY MORE.: NEVER TRUE

## 2025-01-27 SDOH — ECONOMIC STABILITY: FOOD INSECURITY: WITHIN THE PAST 12 MONTHS, THE FOOD YOU BOUGHT JUST DIDN'T LAST AND YOU DIDN'T HAVE MONEY TO GET MORE.: NEVER TRUE

## 2025-01-27 ASSESSMENT — PATIENT HEALTH QUESTIONNAIRE - PHQ9
SUM OF ALL RESPONSES TO PHQ QUESTIONS 1-9: 0
2. FEELING DOWN, DEPRESSED OR HOPELESS: NOT AT ALL
SUM OF ALL RESPONSES TO PHQ QUESTIONS 1-9: 0
1. LITTLE INTEREST OR PLEASURE IN DOING THINGS: NOT AT ALL
SUM OF ALL RESPONSES TO PHQ QUESTIONS 1-9: 0
SUM OF ALL RESPONSES TO PHQ9 QUESTIONS 1 & 2: 0
SUM OF ALL RESPONSES TO PHQ QUESTIONS 1-9: 0

## 2025-01-27 ASSESSMENT — ENCOUNTER SYMPTOMS
RESPIRATORY NEGATIVE: 1
EYES NEGATIVE: 1
ALLERGIC/IMMUNOLOGIC NEGATIVE: 1
GASTROINTESTINAL NEGATIVE: 1

## 2025-01-27 NOTE — PROGRESS NOTES
Subjective:      Patient ID: Kyra Murry is a 59 y.o. female.    Skin Problem  This is a chronic problem. The current episode started more than 1 month ago. The problem has been waxing and waning since onset. The rash is diffuse. The rash is characterized by itchiness and dryness. It is unknown if there was an exposure to a precipitant. Past treatments include moisturizer. The treatment provided moderate relief.       Review of Systems   Constitutional: Negative.    HENT: Negative.     Eyes: Negative.    Respiratory: Negative.     Cardiovascular: Negative.    Gastrointestinal: Negative.    Endocrine: Negative.    Musculoskeletal:  Positive for arthralgias.   Skin: Negative.    Allergic/Immunologic: Negative.    Neurological: Negative.    Hematological: Negative.    Psychiatric/Behavioral:  The patient is nervous/anxious.    Past family and social history unremarkable.  .[email or web address]      Objective:   Physical Exam  Vitals and nursing note reviewed.   Constitutional:       Appearance: She is well-developed.   HENT:      Head: Normocephalic and atraumatic.      Right Ear: External ear normal.      Left Ear: External ear normal.   Eyes:      Conjunctiva/sclera: Conjunctivae normal.      Pupils: Pupils are equal, round, and reactive to light.   Cardiovascular:      Rate and Rhythm: Normal rate and regular rhythm.      Heart sounds: Normal heart sounds.   Pulmonary:      Effort: Pulmonary effort is normal.      Breath sounds: Normal breath sounds.   Abdominal:      General: Bowel sounds are normal.      Palpations: Abdomen is soft.   Genitourinary:     Comments: Postmenopausal syndrome with vaginal dryness  History of repair with mesh regarding bladder prolapse  Musculoskeletal:         General: Normal range of motion.      Cervical back: Normal range of motion and neck supple.      Comments: Musculoskeletal pain without focalization   Skin:     General: Skin is warm and dry.      Comments: Xerosis cutis   Neurological:

## 2025-04-14 NOTE — PROGRESS NOTES
[facility], [facility] UROGYNECOLOGY [facility]VIC REH[facility]IL[facility]   [address] [place] [address]  Dept: [phone]   Patient:  Kyra Murry   :  1965   Visit Date:  2025     VISIT - ANNUAL WELL EXAM  CC: This is an annual well patient visit.     Chaperone present for entire visit and pertinent physical exam: None Required    HPI:  Pt reports feels well.   Prior hysterectomy; oral estrace 1mg/d, if she forgets a dose she will note increase in number and intensity of hot flashes.  No bladder concerns, BM daily, soft, has to lean forward.    Overall state of well-being reviewed. Fall & depression assessments confirmed. Dietary & nutritional habits reviewed & fist-size portions of lean protein, fruits & vegetables, & carbs with each meal recommended. Low glycemic indexed foods recommended for snacks. Water intake discussed with caffeine & alcohol moderation discussed. Weight bearing exercise routines of at least 30 minutes 3 times a week discussed for healthy weight maintenance.  Weight reduction through exercise, a healthy diet, & evidenced based programs like Weight Watchers & NOOM discussed as necessary. Breast symptoms, abnormal abdominal bloating, bowel and bladder function, smoking history, HRT history, sexual activity & partner/s, dyspareunia, and immunizations reviewed.    Topics of Prolapse, Pain, Pressure reviewed including type, period of onset, level of severity, quality and quantity, associations, trends, exacerbators, alleviators, bleeding, prolapse to reduce, splinting, and prior treatment / surgery.    Topics of Urinary Leakage reviewed including type, period of onset, level of severity, quality and quantity, associations, trends, exacerbators, alleviators, urgency, frequency, nocturia ,urge incontinence / stress incontinence triggers, hesitancy, obstruction with need to catheterize, frequent UTI\"s >3 in a year

## 2025-04-21 ENCOUNTER — OFFICE VISIT (OUTPATIENT)
Age: 60
End: 2025-04-21
Payer: COMMERCIAL

## 2025-04-21 ENCOUNTER — HOSPITAL ENCOUNTER (OUTPATIENT)
Age: 60
Setting detail: SPECIMEN
Discharge: HOME | End: 2025-04-21

## 2025-04-21 VITALS
WEIGHT: 142.6 LBS | SYSTOLIC BLOOD PRESSURE: 109 MMHG | DIASTOLIC BLOOD PRESSURE: 66 MMHG | BODY MASS INDEX: 25.27 KG/M2 | HEART RATE: 54 BPM

## 2025-04-21 DIAGNOSIS — Z11.51 SCREENING FOR HPV (HUMAN PAPILLOMAVIRUS): ICD-10-CM

## 2025-04-21 DIAGNOSIS — N95.1 MENOPAUSAL SYMPTOMS: ICD-10-CM

## 2025-04-21 DIAGNOSIS — Z12.31 ENCOUNTER FOR SCREENING MAMMOGRAM FOR MALIGNANT NEOPLASM OF BREAST: ICD-10-CM

## 2025-04-21 DIAGNOSIS — Z01.419 ENCOUNTER FOR ANNUAL ROUTINE GYNECOLOGICAL EXAMINATION: Primary | ICD-10-CM

## 2025-04-21 PROCEDURE — 99396 PREV VISIT EST AGE 40-64: CPT | Performed by: NURSE PRACTITIONER

## 2025-04-21 RX ORDER — ESTRADIOL 0.5 MG/1
0.5 TABLET ORAL DAILY
Qty: 30 TABLET | Refills: 11 | Status: SHIPPED | OUTPATIENT
Start: 2025-04-21

## 2025-04-21 NOTE — PATIENT INSTRUCTIONS
If needed you can add 1 TBSP Benefiber to coffee each morning and elevate feet on stool to help with Bms

## 2025-04-23 LAB
HPV I/H RISK 4 DNA CVX QL NAA+PROBE: NOT DETECTED
HPV SAMPLE: NORMAL
HPV, INTERPRETATION: NORMAL
HPV16 DNA CVX QL NAA+PROBE: NOT DETECTED
HPV18 DNA CVX QL NAA+PROBE: NOT DETECTED
SPECIMEN DESCRIPTION: NORMAL

## 2025-04-29 LAB — CYTOLOGY REPORT: NORMAL

## 2025-05-07 ENCOUNTER — HOSPITAL ENCOUNTER (OUTPATIENT)
Dept: MAMMOGRAPHY | Age: 60
Discharge: HOME | End: 2025-05-09
Payer: COMMERCIAL

## 2025-05-07 VITALS — WEIGHT: 140 LBS | BODY MASS INDEX: 24.8 KG/M2 | HEIGHT: 63 IN

## 2025-05-07 DIAGNOSIS — Z12.31 ENCOUNTER FOR SCREENING MAMMOGRAM FOR MALIGNANT NEOPLASM OF BREAST: ICD-10-CM

## 2025-05-07 PROCEDURE — 77063 BREAST TOMOSYNTHESIS BI: CPT

## 2025-07-29 ENCOUNTER — HOSPITAL ENCOUNTER (OUTPATIENT)
Age: 60
Setting detail: SPECIMEN
Discharge: HOME | End: 2025-07-29

## 2025-07-29 ENCOUNTER — OFFICE VISIT (OUTPATIENT)
Dept: FAMILY MEDICINE CLINIC | Age: 60
End: 2025-07-29
Payer: COMMERCIAL

## 2025-07-29 VITALS
RESPIRATION RATE: 12 BRPM | HEART RATE: 60 BPM | OXYGEN SATURATION: 99 % | HEIGHT: 63 IN | WEIGHT: 142 LBS | TEMPERATURE: 97.8 F | DIASTOLIC BLOOD PRESSURE: 60 MMHG | BODY MASS INDEX: 25.16 KG/M2 | SYSTOLIC BLOOD PRESSURE: 102 MMHG

## 2025-07-29 DIAGNOSIS — E78.2 MIXED HYPERLIPIDEMIA: ICD-10-CM

## 2025-07-29 DIAGNOSIS — L71.0 PERIORAL DERMATITIS: ICD-10-CM

## 2025-07-29 DIAGNOSIS — Z28.21 VACCINATION DECLINED: ICD-10-CM

## 2025-07-29 DIAGNOSIS — M75.81 TENDINITIS OF RIGHT ROTATOR CUFF: ICD-10-CM

## 2025-07-29 DIAGNOSIS — N89.8 VAGINAL DRYNESS: ICD-10-CM

## 2025-07-29 DIAGNOSIS — L85.3 XEROSIS CUTIS: ICD-10-CM

## 2025-07-29 DIAGNOSIS — F41.9 ANXIETY: ICD-10-CM

## 2025-07-29 DIAGNOSIS — N95.1 POSTMENOPAUSAL SYNDROME: ICD-10-CM

## 2025-07-29 DIAGNOSIS — Z90.710 H/O: HYSTERECTOMY: ICD-10-CM

## 2025-07-29 DIAGNOSIS — I87.2 PERIPHERAL VENOUS INSUFFICIENCY: ICD-10-CM

## 2025-07-29 DIAGNOSIS — F41.9 ANXIETY: Primary | ICD-10-CM

## 2025-07-29 DIAGNOSIS — N81.10 FEMALE BLADDER PROLAPSE: ICD-10-CM

## 2025-07-29 LAB
25(OH)D3 SERPL-MCNC: 119 NG/ML (ref 30–100)
ALBUMIN SERPL-MCNC: 4.3 G/DL (ref 3.5–5.2)
ALBUMIN/GLOB SERPL: 1.5 {RATIO} (ref 1–2.5)
ALP SERPL-CCNC: 56 U/L (ref 35–104)
ALT SERPL-CCNC: 19 U/L (ref 10–35)
ANION GAP SERPL CALCULATED.3IONS-SCNC: 11 MMOL/L (ref 9–16)
AST SERPL-CCNC: 19 U/L (ref 10–35)
BILIRUB SERPL-MCNC: 0.8 MG/DL (ref 0–1.2)
BUN SERPL-MCNC: 13 MG/DL (ref 8–23)
CALCIUM SERPL-MCNC: 9.2 MG/DL (ref 8.6–10.4)
CHLORIDE SERPL-SCNC: 103 MMOL/L (ref 98–107)
CHOLEST SERPL-MCNC: 232 MG/DL (ref 0–199)
CHOLESTEROL/HDL RATIO: 2.2
CO2 SERPL-SCNC: 27 MMOL/L (ref 20–31)
CREAT SERPL-MCNC: 0.7 MG/DL (ref 0.6–0.9)
ERYTHROCYTE [DISTWIDTH] IN BLOOD BY AUTOMATED COUNT: 13.3 % (ref 11.8–14.4)
GFR, ESTIMATED: >90 ML/MIN/1.73M2
GLUCOSE SERPL-MCNC: 86 MG/DL (ref 74–99)
HCT VFR BLD AUTO: 41.2 % (ref 36.3–47.1)
HDLC SERPL-MCNC: 104 MG/DL
HGB BLD-MCNC: 13.4 G/DL (ref 11.9–15.1)
LDLC SERPL CALC-MCNC: 114 MG/DL (ref 0–100)
MCH RBC QN AUTO: 29.1 PG (ref 25.2–33.5)
MCHC RBC AUTO-ENTMCNC: 32.5 G/DL (ref 28.4–34.8)
MCV RBC AUTO: 89.4 FL (ref 82.6–102.9)
NRBC BLD-RTO: 0 PER 100 WBC
PLATELET # BLD AUTO: 283 K/UL (ref 138–453)
PMV BLD AUTO: 10.1 FL (ref 8.1–13.5)
POTASSIUM SERPL-SCNC: 3.7 MMOL/L (ref 3.7–5.3)
PROT SERPL-MCNC: 7.1 G/DL (ref 6.6–8.7)
RBC # BLD AUTO: 4.61 M/UL (ref 3.95–5.11)
SODIUM SERPL-SCNC: 141 MMOL/L (ref 136–145)
TRIGL SERPL-MCNC: 70 MG/DL
TSH SERPL DL<=0.05 MIU/L-ACNC: 2.1 UIU/ML (ref 0.27–4.2)
VLDLC SERPL CALC-MCNC: 14 MG/DL (ref 1–30)
WBC OTHER # BLD: 8.2 K/UL (ref 3.5–11.3)

## 2025-07-29 PROCEDURE — 99214 OFFICE O/P EST MOD 30 MIN: CPT | Performed by: FAMILY MEDICINE

## 2025-07-29 RX ORDER — DOXYCYCLINE HYCLATE 20 MG
TABLET ORAL
COMMUNITY
Start: 2025-07-17

## 2025-07-29 RX ORDER — ESTRADIOL 1 MG/1
TABLET ORAL
COMMUNITY
Start: 2025-06-11

## 2025-07-29 ASSESSMENT — ENCOUNTER SYMPTOMS
GASTROINTESTINAL NEGATIVE: 1
EYES NEGATIVE: 1
ALLERGIC/IMMUNOLOGIC NEGATIVE: 1
RESPIRATORY NEGATIVE: 1

## 2025-07-29 NOTE — PROGRESS NOTES
Subjective:      Patient ID: Kyra Murry is a 59 y.o. female.    Rash  This is a recurrent problem. The current episode started more than 1 month ago. The problem has been gradually improving since onset. Location: Angle of the mouth. The rash is characterized by redness and itchiness. It is unknown if there was an exposure to a precipitant. Treatments tried: Doxycycline oral 20 mg. The treatment provided moderate relief.       Review of Systems   Constitutional: Negative.    HENT: Negative.     Eyes: Negative.    Respiratory: Negative.     Cardiovascular: Negative.    Gastrointestinal: Negative.    Endocrine: Negative.    Musculoskeletal:  Positive for arthralgias.   Skin:  Positive for rash.   Allergic/Immunologic: Negative.    Neurological: Negative.    Hematological: Negative.    Psychiatric/Behavioral:  The patient is nervous/anxious.      Past family and social history unremarkable.   Diagnosis Orders   1. Anxiety  CBC    Comprehensive Metabolic Panel    Lipid Panel    TSH    Vitamin D 25 Hydroxy      2. Mixed hyperlipidemia  CBC    Comprehensive Metabolic Panel    Lipid Panel    TSH    Vitamin D 25 Hydroxy      3. H/O: hysterectomy        4. Peripheral venous insufficiency        5. Vaginal dryness        6. Tendinitis of right rotator cuff        7. Postmenopausal syndrome  CBC    Comprehensive Metabolic Panel    Lipid Panel    TSH    Vitamin D 25 Hydroxy      8. Female bladder prolapse        9. Xerosis cutis        10. Vaccination declined        11. Perioral dermatitis            Objective:   Physical Exam  Vitals and nursing note reviewed.   Constitutional:       Appearance: She is well-developed.   HENT:      Head: Normocephalic and atraumatic.      Right Ear: External ear normal.      Left Ear: External ear normal.   Eyes:      Conjunctiva/sclera: Conjunctivae normal.      Pupils: Pupils are equal, round, and reactive to light.   Cardiovascular:      Rate and Rhythm: Normal rate and regular rhythm.